# Patient Record
Sex: FEMALE | Race: WHITE | NOT HISPANIC OR LATINO | Employment: OTHER | URBAN - METROPOLITAN AREA
[De-identification: names, ages, dates, MRNs, and addresses within clinical notes are randomized per-mention and may not be internally consistent; named-entity substitution may affect disease eponyms.]

---

## 2023-02-01 PROBLEM — R10.813 ABDOMINAL RIGHT LOWER QUADRANT TENDERNESS: Status: ACTIVE | Noted: 2023-02-01

## 2023-02-01 PROBLEM — L30.9 DERMATITIS: Status: ACTIVE | Noted: 2023-02-01

## 2023-02-01 PROBLEM — M25.552 CHRONIC LEFT HIP PAIN: Status: ACTIVE | Noted: 2023-02-01

## 2023-02-01 PROBLEM — J30.9 ALLERGIC RHINITIS: Status: ACTIVE | Noted: 2023-02-01

## 2023-02-01 PROBLEM — M54.50 LUMBAR PAIN: Status: ACTIVE | Noted: 2023-02-01

## 2023-02-01 PROBLEM — M81.0 OSTEOPOROSIS: Status: ACTIVE | Noted: 2023-02-01

## 2023-02-01 PROBLEM — M54.2 NECK PAIN: Status: ACTIVE | Noted: 2023-02-01

## 2023-02-01 PROBLEM — M47.812 CERVICAL FACET JOINT SYNDROME: Status: ACTIVE | Noted: 2023-02-01

## 2023-02-01 PROBLEM — M25.512 SHOULDER PAIN, BILATERAL: Status: ACTIVE | Noted: 2023-02-01

## 2023-02-01 PROBLEM — R10.32 ABDOMINAL PAIN, LLQ (LEFT LOWER QUADRANT): Status: ACTIVE | Noted: 2023-02-01

## 2023-02-01 PROBLEM — M25.511 CHRONIC RIGHT SHOULDER PAIN: Status: ACTIVE | Noted: 2023-02-01

## 2023-02-01 PROBLEM — J06.9 ACUTE UPPER RESPIRATORY INFECTION: Status: ACTIVE | Noted: 2023-02-01

## 2023-02-01 PROBLEM — M75.102 ROTATOR CUFF TEAR ARTHROPATHY, LEFT: Status: ACTIVE | Noted: 2023-02-01

## 2023-02-01 PROBLEM — M54.6 THORACIC BACK PAIN: Status: ACTIVE | Noted: 2023-02-01

## 2023-02-01 PROBLEM — J20.9 ACUTE BRONCHITIS: Status: ACTIVE | Noted: 2023-02-01

## 2023-02-01 PROBLEM — B02.29 POSTHERPETIC NEURALGIA: Status: ACTIVE | Noted: 2023-02-01

## 2023-02-01 PROBLEM — L30.9 ECZEMA: Status: ACTIVE | Noted: 2023-02-01

## 2023-02-01 PROBLEM — J31.0 CHRONIC RHINITIS: Status: ACTIVE | Noted: 2023-02-01

## 2023-02-01 PROBLEM — R05.3 CHRONIC COUGH: Status: ACTIVE | Noted: 2023-02-01

## 2023-02-01 PROBLEM — B37.2 CANDIDAL INTERTRIGO: Status: ACTIVE | Noted: 2023-02-01

## 2023-02-01 PROBLEM — M47.819 ARTHRITIS OF FACET JOINTS AT MULTIPLE VERTEBRAL LEVELS: Status: ACTIVE | Noted: 2023-02-01

## 2023-02-01 PROBLEM — B02.9 SHINGLES OUTBREAK: Status: ACTIVE | Noted: 2023-02-01

## 2023-02-01 PROBLEM — N39.0 URINARY TRACT INFECTION, ACUTE: Status: ACTIVE | Noted: 2023-02-01

## 2023-02-01 PROBLEM — J32.9 SINOBRONCHITIS: Status: ACTIVE | Noted: 2023-02-01

## 2023-02-01 PROBLEM — J01.00 ACUTE MAXILLARY SINUSITIS: Status: ACTIVE | Noted: 2023-02-01

## 2023-02-01 PROBLEM — R82.81 PYURIA: Status: ACTIVE | Noted: 2023-02-01

## 2023-02-01 PROBLEM — M67.929 BICEPS TENDINOPATHY: Status: ACTIVE | Noted: 2023-02-01

## 2023-02-01 PROBLEM — M25.511 SHOULDER PAIN, BILATERAL: Status: ACTIVE | Noted: 2023-02-01

## 2023-02-01 PROBLEM — G89.29 CHRONIC RIGHT SHOULDER PAIN: Status: ACTIVE | Noted: 2023-02-01

## 2023-02-01 PROBLEM — M25.551 HIP PAIN, BILATERAL: Status: ACTIVE | Noted: 2023-02-01

## 2023-02-01 PROBLEM — G47.00 INSOMNIA: Status: ACTIVE | Noted: 2023-02-01

## 2023-02-01 PROBLEM — J40 SINOBRONCHITIS: Status: ACTIVE | Noted: 2023-02-01

## 2023-02-01 PROBLEM — Z91.038 ALLERGY TO INSECT BITES: Status: ACTIVE | Noted: 2023-02-01

## 2023-02-01 PROBLEM — M12.812 ROTATOR CUFF TEAR ARTHROPATHY, LEFT: Status: ACTIVE | Noted: 2023-02-01

## 2023-02-01 PROBLEM — E78.5 HYPERLIPIDEMIA: Status: ACTIVE | Noted: 2023-02-01

## 2023-02-01 PROBLEM — R60.9 EDEMA: Status: ACTIVE | Noted: 2023-02-01

## 2023-02-01 PROBLEM — I10 BENIGN ESSENTIAL HYPERTENSION: Status: ACTIVE | Noted: 2023-02-01

## 2023-02-01 PROBLEM — K21.9 ESOPHAGEAL REFLUX: Status: ACTIVE | Noted: 2023-02-01

## 2023-02-01 PROBLEM — M79.603 ARM PAIN: Status: ACTIVE | Noted: 2023-02-01

## 2023-02-01 PROBLEM — M62.838 MUSCLE SPASM: Status: ACTIVE | Noted: 2023-02-01

## 2023-02-01 PROBLEM — R30.0 DYSURIA: Status: ACTIVE | Noted: 2023-02-01

## 2023-02-01 PROBLEM — M50.90 CERVICAL DISC DISEASE: Status: ACTIVE | Noted: 2023-02-01

## 2023-02-01 PROBLEM — M54.12 CERVICAL RADICULOPATHY: Status: ACTIVE | Noted: 2023-02-01

## 2023-02-01 PROBLEM — I25.10 CORONARY ARTERY DISEASE: Status: ACTIVE | Noted: 2023-02-01

## 2023-02-01 PROBLEM — R53.83 FATIGUE: Status: ACTIVE | Noted: 2023-02-01

## 2023-02-01 PROBLEM — R10.13 EPIGASTRIC PAIN: Status: ACTIVE | Noted: 2023-02-01

## 2023-02-01 PROBLEM — G89.29 CHRONIC LEFT HIP PAIN: Status: ACTIVE | Noted: 2023-02-01

## 2023-02-01 RX ORDER — ATORVASTATIN CALCIUM 40 MG/1
20 TABLET, FILM COATED ORAL DAILY
COMMUNITY
End: 2023-05-25

## 2023-02-01 RX ORDER — PANTOPRAZOLE SODIUM 40 MG/1
1 TABLET, DELAYED RELEASE ORAL DAILY
COMMUNITY
Start: 2022-02-21 | End: 2023-05-01 | Stop reason: SDUPTHER

## 2023-02-01 RX ORDER — AMLODIPINE BESYLATE 5 MG/1
1 TABLET ORAL DAILY
COMMUNITY

## 2023-02-01 RX ORDER — NYSTATIN 100000 U/G
OINTMENT TOPICAL
COMMUNITY
Start: 2022-05-04

## 2023-02-01 RX ORDER — ASPIRIN 81 MG/1
1 TABLET ORAL 2 TIMES DAILY
COMMUNITY

## 2023-02-01 RX ORDER — VIT C/E/CUPERIC/ZINC/LUTEIN 226-90-0.8
CAPSULE ORAL
COMMUNITY

## 2023-02-01 RX ORDER — IBUPROFEN 800 MG/1
800 TABLET ORAL 4 TIMES DAILY
COMMUNITY
Start: 2016-02-18

## 2023-03-15 ENCOUNTER — OFFICE VISIT (OUTPATIENT)
Dept: PRIMARY CARE | Facility: CLINIC | Age: 83
End: 2023-03-15
Payer: MEDICARE

## 2023-03-15 VITALS
DIASTOLIC BLOOD PRESSURE: 92 MMHG | HEART RATE: 80 BPM | WEIGHT: 152 LBS | HEIGHT: 62 IN | SYSTOLIC BLOOD PRESSURE: 158 MMHG | BODY MASS INDEX: 27.97 KG/M2

## 2023-03-15 DIAGNOSIS — L29.9 ITCHING: ICD-10-CM

## 2023-03-15 DIAGNOSIS — I10 BENIGN ESSENTIAL HYPERTENSION: Primary | ICD-10-CM

## 2023-03-15 DIAGNOSIS — E78.2 MIXED HYPERLIPIDEMIA: ICD-10-CM

## 2023-03-15 PROCEDURE — 3077F SYST BP >= 140 MM HG: CPT | Performed by: INTERNAL MEDICINE

## 2023-03-15 PROCEDURE — 99213 OFFICE O/P EST LOW 20 MIN: CPT | Performed by: INTERNAL MEDICINE

## 2023-03-15 PROCEDURE — 3080F DIAST BP >= 90 MM HG: CPT | Performed by: INTERNAL MEDICINE

## 2023-03-15 PROCEDURE — 1036F TOBACCO NON-USER: CPT | Performed by: INTERNAL MEDICINE

## 2023-03-15 PROCEDURE — 1159F MED LIST DOCD IN RCRD: CPT | Performed by: INTERNAL MEDICINE

## 2023-03-15 RX ORDER — TRIAMCINOLONE ACETONIDE 1 MG/G
OINTMENT TOPICAL 2 TIMES DAILY PRN
Qty: 60 G | Refills: 0 | Status: SHIPPED | OUTPATIENT
Start: 2023-03-15 | End: 2023-07-13

## 2023-03-15 ASSESSMENT — ENCOUNTER SYMPTOMS
DEPRESSION: 0
SHORTNESS OF BREATH: 0
LOSS OF SENSATION IN FEET: 0
OCCASIONAL FEELINGS OF UNSTEADINESS: 0
CHEST TIGHTNESS: 0
HEADACHES: 0

## 2023-03-15 NOTE — PROGRESS NOTES
"Subjective   Carly Hoffman is a 82 y.o. female who presents for Hypertension.    She is here for routine follow up  Takes half ibuprofen twice a day  No falls.   She will see Dr Figueroa in August.    Review of Systems   Respiratory:  Negative for chest tightness and shortness of breath.    Cardiovascular:  Negative for chest pain.   Neurological:  Negative for headaches.           Objective   BP (!) 158/92   Pulse 80   Ht 1.575 m (5' 2\")   Wt 68.9 kg (152 lb)   BMI 27.80 kg/m²    Physical Exam  Visit Vitals  BP (!) 158/92   Pulse 80   Ht 1.575 m (5' 2\")   Wt 68.9 kg (152 lb)   BMI 27.80 kg/m²   Smoking Status Former   BSA 1.74 m²      GEN: NAD  HEENT: normal  NECK: no adenopathy, no thyroid enlargment  LUNGS: CTAB  CV: reg S1/S2 no murmurs  EXT: no leg edema   Assessment/Plan   Problem List Items Addressed This Visit          Circulatory    Benign essential hypertension - Primary    Relevant Orders    Comprehensive metabolic panel    Lipid Panel    TSH with reflex to Free T4 if abnormal    CBC and Auto Differential       Other    Medicare annual wellness visit, subsequent     Other Visit Diagnoses       Itching        Relevant Medications    triamcinolone (Kenalog) 0.1 % ointment               "

## 2023-03-21 PROBLEM — Z00.00 MEDICARE ANNUAL WELLNESS VISIT, SUBSEQUENT: Status: ACTIVE | Noted: 2023-03-21

## 2023-05-01 DIAGNOSIS — K21.9 GASTROESOPHAGEAL REFLUX DISEASE WITHOUT ESOPHAGITIS: Primary | ICD-10-CM

## 2023-05-02 RX ORDER — PANTOPRAZOLE SODIUM 40 MG/1
40 TABLET, DELAYED RELEASE ORAL
Qty: 90 TABLET | Refills: 1 | Status: SHIPPED | OUTPATIENT
Start: 2023-05-02 | End: 2023-10-30 | Stop reason: SDUPTHER

## 2023-05-20 DIAGNOSIS — I25.10 ATHEROSCLEROTIC HEART DISEASE OF NATIVE CORONARY ARTERY WITHOUT ANGINA PECTORIS: ICD-10-CM

## 2023-05-25 RX ORDER — ATORVASTATIN CALCIUM 40 MG/1
TABLET, FILM COATED ORAL
Qty: 45 TABLET | Refills: 3 | Status: SHIPPED | OUTPATIENT
Start: 2023-05-25 | End: 2024-05-16

## 2023-08-08 ENCOUNTER — TELEPHONE (OUTPATIENT)
Dept: PRIMARY CARE | Facility: CLINIC | Age: 83
End: 2023-08-08
Payer: MEDICARE

## 2023-08-08 NOTE — TELEPHONE ENCOUNTER
Pt daughter Jerrica called and has now became pt's health care power of . She called to see when pt's last colonoscopy and mammogram were.   [id number] to be done every 5 years and is due next year 2024.  Mammogram- last done in 2015. Daughter would like an order put in for a mammogram.  Jerrica also mentioned the family's concerns about pt's speech. Pt has some trouble saying a few words and they have noticed a difference in her speech and would like to know if this can be looked at or this can be treated. Daughter stated they do not believe she had a stroke, has dementia or any memory loss, just trouble with words.   Pt daughter Jerrica cell # [phone]  Work # [phone]

## 2023-08-09 DIAGNOSIS — E53.8 VITAMIN B12 DEFICIENCY: ICD-10-CM

## 2023-08-09 DIAGNOSIS — R41.3 MEMORY LOSS: Primary | ICD-10-CM

## 2023-08-09 DIAGNOSIS — E78.2 MIXED HYPERLIPIDEMIA: ICD-10-CM

## 2023-08-09 DIAGNOSIS — E55.9 VITAMIN D DEFICIENCY: ICD-10-CM

## 2023-08-09 DIAGNOSIS — Z12.31 VISIT FOR SCREENING MAMMOGRAM: Primary | ICD-10-CM

## 2023-08-09 DIAGNOSIS — I10 BENIGN ESSENTIAL HYPERTENSION: ICD-10-CM

## 2023-08-11 ENCOUNTER — LAB (OUTPATIENT)
Dept: LAB | Facility: LAB | Age: 83
End: 2023-08-11
Payer: MEDICARE

## 2023-08-11 DIAGNOSIS — I10 BENIGN ESSENTIAL HYPERTENSION: ICD-10-CM

## 2023-08-11 DIAGNOSIS — E55.9 VITAMIN D DEFICIENCY: ICD-10-CM

## 2023-08-11 DIAGNOSIS — R41.3 MEMORY LOSS: ICD-10-CM

## 2023-08-11 DIAGNOSIS — E78.2 MIXED HYPERLIPIDEMIA: ICD-10-CM

## 2023-08-11 DIAGNOSIS — E53.8 VITAMIN B12 DEFICIENCY: ICD-10-CM

## 2023-08-11 LAB
ALANINE AMINOTRANSFERASE (SGPT) (U/L) IN SER/PLAS: 18 U/L (ref 7–45)
ALBUMIN (G/DL) IN SER/PLAS: 4.4 G/DL (ref 3.4–5)
ALKALINE PHOSPHATASE (U/L) IN SER/PLAS: 61 U/L (ref 33–136)
ANION GAP IN SER/PLAS: 11 MMOL/L (ref 10–20)
ASPARTATE AMINOTRANSFERASE (SGOT) (U/L) IN SER/PLAS: 19 U/L (ref 9–39)
BASOPHILS (10*3/UL) IN BLOOD BY AUTOMATED COUNT: 0.06 X10E9/L (ref 0–0.1)
BASOPHILS/100 LEUKOCYTES IN BLOOD BY AUTOMATED COUNT: 0.8 % (ref 0–2)
BILIRUBIN TOTAL (MG/DL) IN SER/PLAS: 0.5 MG/DL (ref 0–1.2)
CALCIDIOL (25 OH VITAMIN D3) (NG/ML) IN SER/PLAS: 22 NG/ML
CALCIUM (MG/DL) IN SER/PLAS: 9.4 MG/DL (ref 8.6–10.6)
CARBON DIOXIDE, TOTAL (MMOL/L) IN SER/PLAS: 27 MMOL/L (ref 21–32)
CHLORIDE (MMOL/L) IN SER/PLAS: 102 MMOL/L (ref 98–107)
CHOLESTEROL (MG/DL) IN SER/PLAS: 151 MG/DL (ref 0–199)
CHOLESTEROL IN HDL (MG/DL) IN SER/PLAS: 69.8 MG/DL
CHOLESTEROL/HDL RATIO: 2.2
COBALAMIN (VITAMIN B12) (PG/ML) IN SER/PLAS: 300 PG/ML (ref 211–911)
CREATININE (MG/DL) IN SER/PLAS: 0.75 MG/DL (ref 0.5–1.05)
EOSINOPHILS (10*3/UL) IN BLOOD BY AUTOMATED COUNT: 0.26 X10E9/L (ref 0–0.4)
EOSINOPHILS/100 LEUKOCYTES IN BLOOD BY AUTOMATED COUNT: 3.4 % (ref 0–6)
ERYTHROCYTE DISTRIBUTION WIDTH (RATIO) BY AUTOMATED COUNT: 12.2 % (ref 11.5–14.5)
ERYTHROCYTE MEAN CORPUSCULAR HEMOGLOBIN CONCENTRATION (G/DL) BY AUTOMATED: 32 G/DL (ref 32–36)
ERYTHROCYTE MEAN CORPUSCULAR VOLUME (FL) BY AUTOMATED COUNT: 92 FL (ref 80–100)
ERYTHROCYTES (10*6/UL) IN BLOOD BY AUTOMATED COUNT: 4.71 X10E12/L (ref 4–5.2)
GFR FEMALE: 80 ML/MIN/1.73M2
GLUCOSE (MG/DL) IN SER/PLAS: 96 MG/DL (ref 74–99)
HEMATOCRIT (%) IN BLOOD BY AUTOMATED COUNT: 43.5 % (ref 36–46)
HEMOGLOBIN (G/DL) IN BLOOD: 13.9 G/DL (ref 12–16)
IMMATURE GRANULOCYTES/100 LEUKOCYTES IN BLOOD BY AUTOMATED COUNT: 0.4 % (ref 0–0.9)
LDL: 69 MG/DL (ref 0–99)
LEUKOCYTES (10*3/UL) IN BLOOD BY AUTOMATED COUNT: 7.7 X10E9/L (ref 4.4–11.3)
LYMPHOCYTES (10*3/UL) IN BLOOD BY AUTOMATED COUNT: 1.8 X10E9/L (ref 0.8–3)
LYMPHOCYTES/100 LEUKOCYTES IN BLOOD BY AUTOMATED COUNT: 23.3 % (ref 13–44)
MONOCYTES (10*3/UL) IN BLOOD BY AUTOMATED COUNT: 0.75 X10E9/L (ref 0.05–0.8)
MONOCYTES/100 LEUKOCYTES IN BLOOD BY AUTOMATED COUNT: 9.7 % (ref 2–10)
NEUTROPHILS (10*3/UL) IN BLOOD BY AUTOMATED COUNT: 4.82 X10E9/L (ref 1.6–5.5)
NEUTROPHILS/100 LEUKOCYTES IN BLOOD BY AUTOMATED COUNT: 62.4 % (ref 40–80)
NRBC (PER 100 WBCS) BY AUTOMATED COUNT: 0 /100 WBC (ref 0–0)
PLATELETS (10*3/UL) IN BLOOD AUTOMATED COUNT: 199 X10E9/L (ref 150–450)
POTASSIUM (MMOL/L) IN SER/PLAS: 4.4 MMOL/L (ref 3.5–5.3)
PROTEIN TOTAL: 6.7 G/DL (ref 6.4–8.2)
SODIUM (MMOL/L) IN SER/PLAS: 136 MMOL/L (ref 136–145)
THYROTROPIN (MIU/L) IN SER/PLAS BY DETECTION LIMIT <= 0.05 MIU/L: 2.39 MIU/L (ref 0.44–3.98)
TRIGLYCERIDE (MG/DL) IN SER/PLAS: 62 MG/DL (ref 0–149)
UREA NITROGEN (MG/DL) IN SER/PLAS: 17 MG/DL (ref 6–23)
VLDL: 12 MG/DL (ref 0–40)

## 2023-08-11 PROCEDURE — 85025 COMPLETE CBC W/AUTO DIFF WBC: CPT

## 2023-08-11 PROCEDURE — 82306 VITAMIN D 25 HYDROXY: CPT

## 2023-08-11 PROCEDURE — 84425 ASSAY OF VITAMIN B-1: CPT

## 2023-08-11 PROCEDURE — 82607 VITAMIN B-12: CPT

## 2023-08-11 PROCEDURE — 84443 ASSAY THYROID STIM HORMONE: CPT

## 2023-08-11 PROCEDURE — 80061 LIPID PANEL: CPT

## 2023-08-11 PROCEDURE — 36415 COLL VENOUS BLD VENIPUNCTURE: CPT

## 2023-08-11 PROCEDURE — 80053 COMPREHEN METABOLIC PANEL: CPT

## 2023-08-15 LAB — VITAMIN B1, WHOLE BLOOD: 134 NMOL/L (ref 70–180)

## 2023-09-20 ENCOUNTER — APPOINTMENT (OUTPATIENT)
Dept: PRIMARY CARE | Facility: CLINIC | Age: 83
End: 2023-09-20
Payer: MEDICARE

## 2023-09-26 ENCOUNTER — OFFICE VISIT (OUTPATIENT)
Dept: PRIMARY CARE | Facility: CLINIC | Age: 83
End: 2023-09-26
Payer: MEDICARE

## 2023-09-26 VITALS
RESPIRATION RATE: 16 BRPM | SYSTOLIC BLOOD PRESSURE: 139 MMHG | HEIGHT: 62 IN | WEIGHT: 148.4 LBS | BODY MASS INDEX: 27.31 KG/M2 | DIASTOLIC BLOOD PRESSURE: 80 MMHG | HEART RATE: 69 BPM

## 2023-09-26 DIAGNOSIS — I10 BENIGN ESSENTIAL HYPERTENSION: ICD-10-CM

## 2023-09-26 DIAGNOSIS — E78.49 OTHER HYPERLIPIDEMIA: ICD-10-CM

## 2023-09-26 DIAGNOSIS — R47.01 APHASIA: Primary | ICD-10-CM

## 2023-09-26 DIAGNOSIS — R47.1 DYSARTHRIA: ICD-10-CM

## 2023-09-26 PROCEDURE — 1126F AMNT PAIN NOTED NONE PRSNT: CPT | Performed by: INTERNAL MEDICINE

## 2023-09-26 PROCEDURE — 3075F SYST BP GE 130 - 139MM HG: CPT | Performed by: INTERNAL MEDICINE

## 2023-09-26 PROCEDURE — 1159F MED LIST DOCD IN RCRD: CPT | Performed by: INTERNAL MEDICINE

## 2023-09-26 PROCEDURE — 99214 OFFICE O/P EST MOD 30 MIN: CPT | Performed by: INTERNAL MEDICINE

## 2023-09-26 PROCEDURE — 3079F DIAST BP 80-89 MM HG: CPT | Performed by: INTERNAL MEDICINE

## 2023-09-26 PROCEDURE — 1036F TOBACCO NON-USER: CPT | Performed by: INTERNAL MEDICINE

## 2023-09-26 ASSESSMENT — ENCOUNTER SYMPTOMS
DIZZINESS: 0
HEADACHES: 0
SPEECH DIFFICULTY: 1

## 2023-09-26 ASSESSMENT — PATIENT HEALTH QUESTIONNAIRE - PHQ9
1. LITTLE INTEREST OR PLEASURE IN DOING THINGS: NOT AT ALL
2. FEELING DOWN, DEPRESSED OR HOPELESS: NOT AT ALL
SUM OF ALL RESPONSES TO PHQ9 QUESTIONS 1 AND 2: 0

## 2023-09-26 ASSESSMENT — PAIN SCALES - GENERAL: PAINLEVEL: 0-NO PAIN

## 2023-09-26 NOTE — PROGRESS NOTES
"Subjective   Carly Hoffman is a 82 y.o. female who presents for Follow-up.    She has been feeling well.    She has noticed that ine evenings,\"her talk is off\"  She says she's not confused, she just can't get the words out  She says , \" I just can't say some of the things I need to say\"  The longer the day is the worse it gets  Her  says this has been doing on a few years  The patient thought it began with the COVID vaccine    She does not have any trouble with chewing or swallowing      She has no concerns over her memory  No double vision  Does have a daytime only  license   She had a cataract surgery once, but she had some complications and needed a laser procedure so she does not want to have antoher cataract surgery done.       Review of Systems   Neurological:  Positive for speech difficulty. Negative for dizziness and headaches.   Memory test ( mini cog)   Banana, sunrise, chair :   Clock: all numbers were present but hands were incorrect, pointing at the 10 and the 11, but she mentioned she never was good at that  She will occasionally misplace words.   She does not feel muscle weakness in her face or neck.   She is not having double vision.   She is not having any motor symptoms.     Objective   /80 (BP Location: Left arm, Patient Position: Sitting, BP Cuff Size: Adult)   Pulse 69   Resp 16   Ht 1.575 m (5' 2\")   Wt 67.3 kg (148 lb 6.4 oz)   BMI 27.14 kg/m²    Physical Exam  HENT:      Head: Normocephalic.   Eyes:      Extraocular Movements: Extraocular movements intact.      Conjunctiva/sclera: Conjunctivae normal.      Pupils: Pupils are equal, round, and reactive to light.   Cardiovascular:      Rate and Rhythm: Normal rate and regular rhythm.      Heart sounds: Normal heart sounds.   Pulmonary:      Effort: Pulmonary effort is normal.      Breath sounds: Normal breath sounds.   Abdominal:      General: Abdomen is flat.      Palpations: Abdomen is soft.      Tenderness: There is " no abdominal tenderness.   Neurological:      Mental Status: She is alert and oriented to person, place, and time.      Cranial Nerves: Cranial nerves 2-12 are intact. No dysarthria or facial asymmetry.      Motor: No pronator drift.   Psychiatric:         Mood and Affect: Mood normal.         Thought Content: Thought content normal.         Assessment/Plan   Problem List Items Addressed This Visit       Benign essential hypertension continue on amlodipine     Hyperlipidemia continue on atorvastatin     Other Visit Diagnoses       Aphasia    -  Primary    Relevant Orders    MR brain wo IV contrast    Referral to Neurology    Dysarthria        Relevant Orders    MR brain wo IV contrast    Referral to Neurology

## 2023-09-26 NOTE — PATIENT INSTRUCTIONS
Schedule MRI of brain.  This can be done here at our building.    Schedule with Dr Garcia.    Monitor your symptoms.   See me again in 3 months.

## 2023-10-11 ENCOUNTER — HOSPITAL ENCOUNTER (OUTPATIENT)
Dept: RADIOLOGY | Facility: CLINIC | Age: 83
Discharge: HOME | End: 2023-10-11
Payer: MEDICARE

## 2023-10-11 DIAGNOSIS — R47.01 APHASIA: ICD-10-CM

## 2023-10-11 DIAGNOSIS — R47.1 DYSARTHRIA: ICD-10-CM

## 2023-10-11 PROCEDURE — 70551 MRI BRAIN STEM W/O DYE: CPT | Mod: ME

## 2023-10-11 PROCEDURE — 70551 MRI BRAIN STEM W/O DYE: CPT | Performed by: RADIOLOGY

## 2023-10-12 ENCOUNTER — TELEPHONE (OUTPATIENT)
Dept: PRIMARY CARE | Facility: CLINIC | Age: 83
End: 2023-10-12
Payer: MEDICARE

## 2023-10-12 NOTE — TELEPHONE ENCOUNTER
Pt daughter Jerrica called and requested she be called when results for pt's MRI come in. Daughter stated if there is bad news she would like to tell her mom herself that way she doesn't get upset. Daughter Jerrica is on HIPAA.  Jerrica- [phone]

## 2023-10-30 DIAGNOSIS — K21.9 GASTROESOPHAGEAL REFLUX DISEASE WITHOUT ESOPHAGITIS: ICD-10-CM

## 2023-10-30 RX ORDER — PANTOPRAZOLE SODIUM 40 MG/1
40 TABLET, DELAYED RELEASE ORAL
Qty: 90 TABLET | Refills: 1 | Status: SHIPPED | OUTPATIENT
Start: 2023-10-30

## 2023-12-18 ENCOUNTER — OFFICE VISIT (OUTPATIENT)
Dept: PRIMARY CARE | Facility: CLINIC | Age: 83
End: 2023-12-18
Payer: MEDICARE

## 2023-12-18 VITALS
WEIGHT: 152.6 LBS | HEIGHT: 62 IN | SYSTOLIC BLOOD PRESSURE: 140 MMHG | HEART RATE: 76 BPM | BODY MASS INDEX: 28.08 KG/M2 | DIASTOLIC BLOOD PRESSURE: 80 MMHG | RESPIRATION RATE: 16 BRPM

## 2023-12-18 DIAGNOSIS — I10 BENIGN ESSENTIAL HYPERTENSION: ICD-10-CM

## 2023-12-18 DIAGNOSIS — E55.9 VITAMIN D DEFICIENCY: ICD-10-CM

## 2023-12-18 DIAGNOSIS — Z13.89 ENCOUNTER FOR SCREENING FOR OTHER DISORDER: ICD-10-CM

## 2023-12-18 DIAGNOSIS — L29.9 ITCHING: Primary | ICD-10-CM

## 2023-12-18 DIAGNOSIS — L30.9 DERMATITIS: ICD-10-CM

## 2023-12-18 DIAGNOSIS — E53.8 VITAMIN B12 DEFICIENCY: ICD-10-CM

## 2023-12-18 DIAGNOSIS — Z00.00 ENCOUNTER FOR PREVENTATIVE ADULT HEALTH CARE EXAMINATION: ICD-10-CM

## 2023-12-18 PROCEDURE — 3079F DIAST BP 80-89 MM HG: CPT | Performed by: INTERNAL MEDICINE

## 2023-12-18 PROCEDURE — G0442 ANNUAL ALCOHOL SCREEN 15 MIN: HCPCS | Performed by: INTERNAL MEDICINE

## 2023-12-18 PROCEDURE — 1159F MED LIST DOCD IN RCRD: CPT | Performed by: INTERNAL MEDICINE

## 2023-12-18 PROCEDURE — 1126F AMNT PAIN NOTED NONE PRSNT: CPT | Performed by: INTERNAL MEDICINE

## 2023-12-18 PROCEDURE — 1170F FXNL STATUS ASSESSED: CPT | Performed by: INTERNAL MEDICINE

## 2023-12-18 PROCEDURE — 3077F SYST BP >= 140 MM HG: CPT | Performed by: INTERNAL MEDICINE

## 2023-12-18 PROCEDURE — 1036F TOBACCO NON-USER: CPT | Performed by: INTERNAL MEDICINE

## 2023-12-18 PROCEDURE — G0439 PPPS, SUBSEQ VISIT: HCPCS | Performed by: INTERNAL MEDICINE

## 2023-12-18 RX ORDER — TRIAMCINOLONE ACETONIDE 1 MG/G
OINTMENT TOPICAL
Qty: 30 G | Refills: 5 | Status: SHIPPED | OUTPATIENT
Start: 2023-12-18

## 2023-12-18 ASSESSMENT — LIFESTYLE VARIABLES
AUDIT-C TOTAL SCORE: 0
HOW MANY STANDARD DRINKS CONTAINING ALCOHOL DO YOU HAVE ON A TYPICAL DAY: PATIENT DOES NOT DRINK
HOW OFTEN DO YOU HAVE A DRINK CONTAINING ALCOHOL: NEVER
SKIP TO QUESTIONS 9-10: 1
HOW OFTEN DO YOU HAVE SIX OR MORE DRINKS ON ONE OCCASION: NEVER

## 2023-12-18 ASSESSMENT — ACTIVITIES OF DAILY LIVING (ADL)
TAKING_MEDICATION: INDEPENDENT
DOING_HOUSEWORK: INDEPENDENT
GROCERY_SHOPPING: INDEPENDENT
DRESSING: INDEPENDENT
BATHING: INDEPENDENT
MANAGING_FINANCES: INDEPENDENT

## 2023-12-18 ASSESSMENT — PAIN SCALES - GENERAL: PAINLEVEL: 0-NO PAIN

## 2023-12-18 ASSESSMENT — PATIENT HEALTH QUESTIONNAIRE - PHQ9
SUM OF ALL RESPONSES TO PHQ9 QUESTIONS 1 AND 2: 0
1. LITTLE INTEREST OR PLEASURE IN DOING THINGS: NOT AT ALL
2. FEELING DOWN, DEPRESSED OR HOPELESS: NOT AT ALL

## 2023-12-18 NOTE — PATIENT INSTRUCTIONS
See me in 6 months.    CLOSE TO THAT VISIT: Get blood test done after fasting overnight.  The  lab is on the first floor.  Lab hours are 7 am to 4 pm Mon-Fri and 8am to Noon on Saturday.  No appt is needed.  Orders are entered into the system so you do not need a paper order.

## 2023-12-18 NOTE — PROGRESS NOTES
"Subjective   Reason for Visit: Carly Hoffman is an 83 y.o. female here for a Medicare Wellness visit.          Reviewed all medications by prescribing practitioner or clinical pharmacist (such as prescriptions, OTCs, herbal therapies and supplements) and documented in the medical record.    HPI    She has been coughing but has been better since taking an otc allergy med 4 times a say  Her  ( brady \"kiana\") had them    She has a neurology appt for February   She still has those issues with trouble with fluency at night  She wondered if the covid booster could have caused this: no  She is taking tylenol pm: discussed this could cause this so she will lessen her usage    Patient Care Team:  Geraldine Hoang DO as PCP - General  Geraldine Hoang DO as PCP - [facility] Attributed Provider     Review of Systems    Objective   Vitals:  BP (!) 152/103 (BP Location: Right arm, Patient Position: Sitting, BP Cuff Size: Adult)   Pulse 76   Resp 16   Ht 1.575 m (5' 2\")   Wt 69.2 kg (152 lb 9.6 oz)   BMI 27.91 kg/m²       Physical Exam      GEN: NAD  HEENT: normal  NECK: no adenopathy, no thyroid enlargment  LUNGS: CTAB  CV: reg S1/S2 no murmurs  EXT: no leg edema   Assessment/Plan   Problem List Items Addressed This Visit    None  Enc for preventive health care visit / Medicare Wellness Visit completed today.  She needed refill of triamcinolone       "

## 2024-01-19 DIAGNOSIS — F41.9 ANXIETY: Primary | ICD-10-CM

## 2024-01-19 RX ORDER — LORAZEPAM 0.5 MG/1
TABLET ORAL
Qty: 15 TABLET | Refills: 0 | Status: SHIPPED | OUTPATIENT
Start: 2024-01-19
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
## 2024-01-24 ENCOUNTER — TELEPHONE (OUTPATIENT)
Dept: PRIMARY CARE | Facility: CLINIC | Age: 84
End: 2024-01-24
Payer: MEDICARE

## 2024-01-24 DIAGNOSIS — M47.819 ARTHRITIS OF FACET JOINTS AT MULTIPLE VERTEBRAL LEVELS: ICD-10-CM

## 2024-01-24 DIAGNOSIS — F09 COGNITIVE DYSFUNCTION: ICD-10-CM

## 2024-01-24 DIAGNOSIS — F43.11 ACUTE POST-TRAUMATIC STRESS DISORDER: Primary | ICD-10-CM

## 2024-01-24 DIAGNOSIS — F32.A DEPRESSION, UNSPECIFIED DEPRESSION TYPE: ICD-10-CM

## 2024-01-24 NOTE — TELEPHONE ENCOUNTER
Wilda, these orders are in. Do we need to fax them to this provider? The phone number for her is [phone] so they can get the fax and send it. . I sent it to Tarsha too.
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
## 2024-02-05 ENCOUNTER — TELEPHONE (OUTPATIENT)
Dept: PRIMARY CARE | Facility: CLINIC | Age: 84
End: 2024-02-05
Payer: MEDICARE

## 2024-02-05 NOTE — TELEPHONE ENCOUNTER
Daughter Chari called to inform you pt is going to counseling at [facility] in [facility].   [phone]

## 2024-02-13 ENCOUNTER — OFFICE VISIT (OUTPATIENT)
Dept: NEUROLOGY | Facility: CLINIC | Age: 84
End: 2024-02-13
Payer: MEDICARE

## 2024-02-13 VITALS
TEMPERATURE: 97.9 F | SYSTOLIC BLOOD PRESSURE: 144 MMHG | WEIGHT: 147 LBS | RESPIRATION RATE: 16 BRPM | BODY MASS INDEX: 27.05 KG/M2 | DIASTOLIC BLOOD PRESSURE: 84 MMHG | HEIGHT: 62 IN | HEART RATE: 80 BPM

## 2024-02-13 DIAGNOSIS — F51.02 ADJUSTMENT INSOMNIA: ICD-10-CM

## 2024-02-13 DIAGNOSIS — R47.9 DIFFICULTY WITH SPEECH: Primary | ICD-10-CM

## 2024-02-13 DIAGNOSIS — R41.3 MEMORY LOSS: ICD-10-CM

## 2024-02-13 DIAGNOSIS — G45.9 TIA (TRANSIENT ISCHEMIC ATTACK): ICD-10-CM

## 2024-02-13 DIAGNOSIS — R47.1 DYSARTHRIA: ICD-10-CM

## 2024-02-13 PROCEDURE — 1126F AMNT PAIN NOTED NONE PRSNT: CPT | Performed by: PSYCHIATRY & NEUROLOGY

## 2024-02-13 PROCEDURE — 3079F DIAST BP 80-89 MM HG: CPT | Performed by: PSYCHIATRY & NEUROLOGY

## 2024-02-13 PROCEDURE — 1036F TOBACCO NON-USER: CPT | Performed by: PSYCHIATRY & NEUROLOGY

## 2024-02-13 PROCEDURE — 99205 OFFICE O/P NEW HI 60 MIN: CPT | Performed by: PSYCHIATRY & NEUROLOGY

## 2024-02-13 PROCEDURE — 1157F ADVNC CARE PLAN IN RCRD: CPT | Performed by: PSYCHIATRY & NEUROLOGY

## 2024-02-13 PROCEDURE — 1159F MED LIST DOCD IN RCRD: CPT | Performed by: PSYCHIATRY & NEUROLOGY

## 2024-02-13 PROCEDURE — 3077F SYST BP >= 140 MM HG: CPT | Performed by: PSYCHIATRY & NEUROLOGY

## 2024-02-13 PROCEDURE — 1160F RVW MEDS BY RX/DR IN RCRD: CPT | Performed by: PSYCHIATRY & NEUROLOGY

## 2024-02-13 RX ORDER — ACETAMINOPHEN, DIPHENHYDRAMINE HCL, PHENYLEPHRINE HCL 325; 25; 5 MG/1; MG/1; MG/1
10 TABLET ORAL NIGHTLY
COMMUNITY

## 2024-02-13 RX ORDER — TRAZODONE HYDROCHLORIDE 50 MG/1
50 TABLET ORAL DAILY PRN
COMMUNITY
Start: 2024-01-24 | End: 2024-07-22

## 2024-02-13 RX ORDER — MAGNESIUM 250 MG
250 TABLET ORAL NIGHTLY
COMMUNITY

## 2024-02-13 ASSESSMENT — ENCOUNTER SYMPTOMS: SPEECH DIFFICULTY: 1
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
## 2024-02-13 NOTE — PATIENT INSTRUCTIONS
The patient needs an MRA of the brain, carotid ultrasound, echocardiogram and lipid panel.  The patient does need an EEG and a workup for the reversible causes of dementia.  The patient needs neuropsychiatric testing as well as a speech therapy consult.  I would certainly continue all of her medicines and stroke risk factor modification.  I would certainly continue her melatonin, magnesium and trazodone for sleep.  The patient certainly needs to continue to follow with her counselor for grief counseling.  The patient does need to follow-up with her geriatrician.  I discussed all these issues in detail with the patient and her daughter and answered all their questions.  The patient will follow-up with me in 4 months.

## 2024-02-17 ENCOUNTER — LAB (OUTPATIENT)
Dept: LAB | Facility: LAB | Age: 84
End: 2024-02-17
Payer: MEDICARE

## 2024-02-17 DIAGNOSIS — E53.8 VITAMIN B12 DEFICIENCY: ICD-10-CM

## 2024-02-17 DIAGNOSIS — I10 BENIGN ESSENTIAL HYPERTENSION: ICD-10-CM

## 2024-02-17 DIAGNOSIS — E55.9 VITAMIN D DEFICIENCY: ICD-10-CM

## 2024-02-17 DIAGNOSIS — R41.3 MEMORY LOSS: ICD-10-CM

## 2024-02-17 PROCEDURE — 84443 ASSAY THYROID STIM HORMONE: CPT

## 2024-02-17 PROCEDURE — 83921 ORGANIC ACID SINGLE QUANT: CPT

## 2024-02-17 PROCEDURE — 80061 LIPID PANEL: CPT

## 2024-02-17 PROCEDURE — 82746 ASSAY OF FOLIC ACID SERUM: CPT

## 2024-02-17 PROCEDURE — 82306 VITAMIN D 25 HYDROXY: CPT

## 2024-02-17 PROCEDURE — 80053 COMPREHEN METABOLIC PANEL: CPT

## 2024-02-17 PROCEDURE — 85025 COMPLETE CBC W/AUTO DIFF WBC: CPT

## 2024-02-17 PROCEDURE — 36415 COLL VENOUS BLD VENIPUNCTURE: CPT

## 2024-02-17 PROCEDURE — 82607 VITAMIN B-12: CPT

## 2024-02-18 LAB
25(OH)D3 SERPL-MCNC: 17 NG/ML (ref 30–100)
ALBUMIN SERPL BCP-MCNC: 4.4 G/DL (ref 3.4–5)
ALP SERPL-CCNC: 55 U/L (ref 33–136)
ALT SERPL W P-5'-P-CCNC: 20 U/L (ref 7–45)
ANION GAP SERPL CALC-SCNC: 16 MMOL/L (ref 10–20)
AST SERPL W P-5'-P-CCNC: 21 U/L (ref 9–39)
BASOPHILS # BLD AUTO: 0.05 X10*3/UL (ref 0–0.1)
BASOPHILS NFR BLD AUTO: 0.8 %
BILIRUB SERPL-MCNC: 0.6 MG/DL (ref 0–1.2)
BUN SERPL-MCNC: 10 MG/DL (ref 6–23)
CALCIUM SERPL-MCNC: 9.7 MG/DL (ref 8.6–10.6)
CHLORIDE SERPL-SCNC: 98 MMOL/L (ref 98–107)
CHOLEST SERPL-MCNC: 148 MG/DL (ref 0–199)
CHOLESTEROL/HDL RATIO: 2
CO2 SERPL-SCNC: 26 MMOL/L (ref 21–32)
CREAT SERPL-MCNC: 0.8 MG/DL (ref 0.5–1.05)
EGFRCR SERPLBLD CKD-EPI 2021: 74 ML/MIN/1.73M*2
EOSINOPHIL # BLD AUTO: 0.17 X10*3/UL (ref 0–0.4)
EOSINOPHIL NFR BLD AUTO: 2.6 %
ERYTHROCYTE [DISTWIDTH] IN BLOOD BY AUTOMATED COUNT: 11.8 % (ref 11.5–14.5)
FOLATE SERPL-MCNC: >24 NG/ML
GLUCOSE SERPL-MCNC: 97 MG/DL (ref 74–99)
HCT VFR BLD AUTO: 43.7 % (ref 36–46)
HDLC SERPL-MCNC: 74.8 MG/DL
HGB BLD-MCNC: 14.6 G/DL (ref 12–16)
IMM GRANULOCYTES # BLD AUTO: 0.03 X10*3/UL (ref 0–0.5)
IMM GRANULOCYTES NFR BLD AUTO: 0.5 % (ref 0–0.9)
LDLC SERPL CALC-MCNC: 60 MG/DL
LYMPHOCYTES # BLD AUTO: 1.53 X10*3/UL (ref 0.8–3)
LYMPHOCYTES NFR BLD AUTO: 23.2 %
MCH RBC QN AUTO: 29.9 PG (ref 26–34)
MCHC RBC AUTO-ENTMCNC: 33.4 G/DL (ref 32–36)
MCV RBC AUTO: 90 FL (ref 80–100)
MONOCYTES # BLD AUTO: 0.82 X10*3/UL (ref 0.05–0.8)
MONOCYTES NFR BLD AUTO: 12.4 %
NEUTROPHILS # BLD AUTO: 4 X10*3/UL (ref 1.6–5.5)
NEUTROPHILS NFR BLD AUTO: 60.5 %
NON HDL CHOLESTEROL: 73 MG/DL (ref 0–149)
NRBC BLD-RTO: 0 /100 WBCS (ref 0–0)
PLATELET # BLD AUTO: 205 X10*3/UL (ref 150–450)
POTASSIUM SERPL-SCNC: 4.6 MMOL/L (ref 3.5–5.3)
PROT SERPL-MCNC: 6.5 G/DL (ref 6.4–8.2)
RBC # BLD AUTO: 4.88 X10*6/UL (ref 4–5.2)
SODIUM SERPL-SCNC: 135 MMOL/L (ref 136–145)
TRIGL SERPL-MCNC: 64 MG/DL (ref 0–149)
TSH SERPL-ACNC: 1.57 MIU/L (ref 0.44–3.98)
VIT B12 SERPL-MCNC: 450 PG/ML (ref 211–911)
VLDL: 13 MG/DL (ref 0–40)
WBC # BLD AUTO: 6.6 X10*3/UL (ref 4.4–11.3)

## 2024-02-19 ENCOUNTER — HOSPITAL ENCOUNTER (OUTPATIENT)
Dept: NEUROLOGY | Facility: HOSPITAL | Age: 84
Discharge: HOME | End: 2024-02-19
Payer: MEDICARE

## 2024-02-19 DIAGNOSIS — R41.3 MEMORY LOSS: ICD-10-CM

## 2024-02-19 DIAGNOSIS — E55.9 VITAMIN D DEFICIENCY: Primary | ICD-10-CM

## 2024-02-19 DIAGNOSIS — E87.1 HYPONATREMIA: ICD-10-CM

## 2024-02-19 DIAGNOSIS — R47.9 DIFFICULTY WITH SPEECH: ICD-10-CM

## 2024-02-19 PROCEDURE — 95816 EEG AWAKE AND DROWSY: CPT | Performed by: PSYCHIATRY & NEUROLOGY

## 2024-02-19 PROCEDURE — 95816 EEG AWAKE AND DROWSY: CPT

## 2024-02-19 RX ORDER — ACETAMINOPHEN 500 MG
5000 TABLET ORAL DAILY
Qty: 90 TABLET | Refills: 1 | Status: SHIPPED | OUTPATIENT
Start: 2024-02-19

## 2024-02-21 LAB — METHYLMALONATE SERPL-SCNC: 0.1 UMOL/L (ref 0–0.4)

## 2024-03-13 ENCOUNTER — HOSPITAL ENCOUNTER (OUTPATIENT)
Dept: CARDIOLOGY | Facility: CLINIC | Age: 84
Discharge: HOME | End: 2024-03-13
Payer: MEDICARE

## 2024-03-13 ENCOUNTER — HOSPITAL ENCOUNTER (OUTPATIENT)
Dept: VASCULAR MEDICINE | Facility: CLINIC | Age: 84
Discharge: HOME | End: 2024-03-13
Payer: MEDICARE

## 2024-03-13 DIAGNOSIS — R47.1 DYSARTHRIA: ICD-10-CM

## 2024-03-13 DIAGNOSIS — R47.81 SLURRED SPEECH: ICD-10-CM

## 2024-03-13 DIAGNOSIS — G45.9 TIA (TRANSIENT ISCHEMIC ATTACK): ICD-10-CM

## 2024-03-13 DIAGNOSIS — R09.89 OTHER SPECIFIED SYMPTOMS AND SIGNS INVOLVING THE CIRCULATORY AND RESPIRATORY SYSTEMS: ICD-10-CM

## 2024-03-13 PROCEDURE — 93306 TTE W/DOPPLER COMPLETE: CPT | Performed by: STUDENT IN AN ORGANIZED HEALTH CARE EDUCATION/TRAINING PROGRAM

## 2024-03-13 PROCEDURE — 93880 EXTRACRANIAL BILAT STUDY: CPT | Performed by: INTERNAL MEDICINE

## 2024-03-13 PROCEDURE — 93880 EXTRACRANIAL BILAT STUDY: CPT

## 2024-03-13 PROCEDURE — 93306 TTE W/DOPPLER COMPLETE: CPT

## 2024-03-14 DIAGNOSIS — I65.21 INTERNAL CAROTID ARTERY STENOSIS, RIGHT: ICD-10-CM

## 2024-03-14 LAB
AORTIC VALVE PEAK VELOCITY: 1.68 M/S
AV PEAK GRADIENT: 11.3 MMHG
AVA (PEAK VEL): 1.96 CM2
EJECTION FRACTION APICAL 4 CHAMBER: 68.3
EJECTION FRACTION: 70 %
LEFT ATRIUM VOLUME AREA LENGTH INDEX BSA: 29.2 ML/M2
LEFT VENTRICLE INTERNAL DIMENSION DIASTOLE: 3.3 CM (ref 3.5–6)
LEFT VENTRICULAR OUTFLOW TRACT DIAMETER: 2.04 CM
MITRAL VALVE E/A RATIO: 0.77
MITRAL VALVE E/E' RATIO: 9.97
RIGHT VENTRICLE FREE WALL PEAK S': 10.73 CM/S
TRICUSPID ANNULAR PLANE SYSTOLIC EXCURSION: 2.6 CM

## 2024-03-15 ENCOUNTER — HOSPITAL ENCOUNTER (OUTPATIENT)
Dept: RADIOLOGY | Facility: CLINIC | Age: 84
Discharge: HOME | End: 2024-03-15
Payer: MEDICARE

## 2024-03-15 DIAGNOSIS — I65.21 INTERNAL CAROTID ARTERY STENOSIS, RIGHT: ICD-10-CM

## 2024-03-15 DIAGNOSIS — G45.9 TIA (TRANSIENT ISCHEMIC ATTACK): ICD-10-CM

## 2024-03-15 PROCEDURE — 70544 MR ANGIOGRAPHY HEAD W/O DYE: CPT

## 2024-03-15 PROCEDURE — 70547 MR ANGIOGRAPHY NECK W/O DYE: CPT | Performed by: STUDENT IN AN ORGANIZED HEALTH CARE EDUCATION/TRAINING PROGRAM

## 2024-03-15 PROCEDURE — 70544 MR ANGIOGRAPHY HEAD W/O DYE: CPT | Performed by: STUDENT IN AN ORGANIZED HEALTH CARE EDUCATION/TRAINING PROGRAM

## 2024-03-15 PROCEDURE — 70547 MR ANGIOGRAPHY NECK W/O DYE: CPT

## 2024-03-20 DIAGNOSIS — I65.21 OCCLUSION OF RIGHT CAROTID ARTERY: ICD-10-CM

## 2024-04-05 ENCOUNTER — TELEPHONE (OUTPATIENT)
Dept: PRIMARY CARE | Facility: CLINIC | Age: 84
End: 2024-04-05
Payer: MEDICARE

## 2024-04-05 NOTE — TELEPHONE ENCOUNTER
Daughter Jerrica called stating the patient had stomach virus    The patient had diarrhea and cramping    Jerrica states she is better now    She said she gave her Pedialyte, crackers, and ginger ale    She also gave he Immodium and she want to know if that is ok     Please advise

## 2024-05-14 ENCOUNTER — OFFICE VISIT (OUTPATIENT)
Dept: VASCULAR SURGERY | Facility: CLINIC | Age: 84
End: 2024-05-14
Payer: MEDICARE

## 2024-05-14 VITALS
BODY MASS INDEX: 25.4 KG/M2 | DIASTOLIC BLOOD PRESSURE: 64 MMHG | WEIGHT: 138 LBS | HEIGHT: 62 IN | SYSTOLIC BLOOD PRESSURE: 126 MMHG | OXYGEN SATURATION: 99 % | HEART RATE: 89 BPM

## 2024-05-14 DIAGNOSIS — I65.21 OCCLUSION OF RIGHT CAROTID ARTERY: ICD-10-CM

## 2024-05-14 PROCEDURE — 1036F TOBACCO NON-USER: CPT | Performed by: SURGERY

## 2024-05-14 PROCEDURE — 1157F ADVNC CARE PLAN IN RCRD: CPT | Performed by: SURGERY

## 2024-05-14 PROCEDURE — 99204 OFFICE O/P NEW MOD 45 MIN: CPT | Performed by: SURGERY

## 2024-05-14 PROCEDURE — 1159F MED LIST DOCD IN RCRD: CPT | Performed by: SURGERY

## 2024-05-14 PROCEDURE — 1160F RVW MEDS BY RX/DR IN RCRD: CPT | Performed by: SURGERY

## 2024-05-14 PROCEDURE — 3074F SYST BP LT 130 MM HG: CPT | Performed by: SURGERY

## 2024-05-14 PROCEDURE — 3078F DIAST BP <80 MM HG: CPT | Performed by: SURGERY

## 2024-05-14 ASSESSMENT — ENCOUNTER SYMPTOMS
DIZZINESS: 0
WOUND: 0
HEADACHES: 0
SPEECH DIFFICULTY: 1
NUMBNESS: 0
GASTROINTESTINAL NEGATIVE: 1
PSYCHIATRIC NEGATIVE: 1
COUGH: 0
COLOR CHANGE: 0
SHORTNESS OF BREATH: 0
BACK PAIN: 0
EYES NEGATIVE: 1
ENDOCRINE NEGATIVE: 1
CONSTITUTIONAL NEGATIVE: 1
WEAKNESS: 0
BRUISES/BLEEDS EASILY: 0

## 2024-05-14 NOTE — PROGRESS NOTES
History Of Present Illness  Carly Hoffman is a 83 y.o. female presenting for carotid evaluation. She was referred by Dr. Garcia. She presents with her granddaughter who helps her give a history.  States that she speaks over time.  She states this has been going on since 2020.  She denies any specific episodes but states it has been a gradual process.  She denies any extremity weakness or visual changes.  In her workup she did have a carotid duplex and MRA, both of which revealed findings of right carotid artery occlusion.     Past Medical History  She has a past medical history of Body mass index (BMI) 28.0-28.9, adult, Myocardial infarction (Multi) (2006), Personal history of other diseases of the circulatory system (03/16/2016), and Personal history of other diseases of the respiratory system (01/14/2021).    Surgical History  She has a past surgical history that includes Other surgical history (03/16/2016); Total hip arthroplasty (Bilateral, 10/23/2019); Colonoscopy (06/18/2020); Carpal tunnel release (06/29/2015); Other surgical history (06/29/2015); Hysterectomy (06/29/2015); Other surgical history (06/29/2015); Cataract extraction (Right); and Breast biopsy.     Social History  She reports that she quit smoking about 54 years ago. Her smoking use included cigarettes. She has been exposed to tobacco smoke. She has never used smokeless tobacco. She reports that she does not currently use alcohol. She reports that she does not use drugs.    Family History  Family History   Problem Relation Name Age of Onset    Other (CABG) Mother      Coronary artery disease Mother      Macular degeneration Mother      Alcohol abuse Father      Liver cancer Father      Dementia Brother          Allergies  Codeine phosphate    Review of Systems   Constitutional: Negative.    HENT: Negative.     Eyes: Negative.    Respiratory:  Negative for cough and shortness of breath.    Cardiovascular:  Negative for chest pain and leg  "swelling.   Gastrointestinal: Negative.    Endocrine: Negative.    Genitourinary: Negative.    Musculoskeletal:  Negative for back pain and gait problem.   Skin:  Negative for color change, pallor and wound.   Neurological:  Positive for speech difficulty. Negative for dizziness, syncope, weakness, numbness and headaches.   Hematological:  Does not bruise/bleed easily.   Psychiatric/Behavioral: Negative.          Physical Exam  Vitals reviewed.   Constitutional:       General: She is not in acute distress.     Appearance: Normal appearance. She is normal weight.   HENT:      Head: Normocephalic and atraumatic.   Eyes:      Extraocular Movements: Extraocular movements intact.      Conjunctiva/sclera: Conjunctivae normal.   Neck:      Vascular: No carotid bruit.   Cardiovascular:      Rate and Rhythm: Normal rate and regular rhythm.      Pulses: Normal pulses.           Radial pulses are 2+ on the right side and 2+ on the left side.      Heart sounds: Normal heart sounds.   Pulmonary:      Effort: Pulmonary effort is normal.      Breath sounds: Normal breath sounds.   Abdominal:      General: Abdomen is flat.      Palpations: Abdomen is soft.   Musculoskeletal:         General: No swelling. Normal range of motion.      Cervical back: Normal range of motion. No tenderness.   Skin:     General: Skin is warm and dry.   Neurological:      General: No focal deficit present.      Mental Status: She is alert and oriented to person, place, and time.      Cranial Nerves: Dysarthria present. No cranial nerve deficit.      Sensory: No sensory deficit.      Motor: Motor function is intact. No weakness.   Psychiatric:         Mood and Affect: Mood normal.         Behavior: Behavior normal.          Last Recorded Vitals  Blood pressure 126/64, pulse 89, height 1.575 m (5' 2\"), weight 62.6 kg (138 lb), SpO2 99%.    Relevant Results      Current Outpatient Medications:     amLODIPine (Norvasc) 5 mg tablet, Take 1 tablet (5 mg) by " mouth once daily., Disp: , Rfl:     aspirin 81 mg EC tablet, Take 1 tablet (81 mg) by mouth 2 times a day., Disp: , Rfl:     atorvastatin (Lipitor) 40 mg tablet, TAKE 1/2 TABLET BY MOUTH EVERY DAY, Disp: 45 tablet, Rfl: 3    cholecalciferol (Vitamin D-3) 5,000 Units tablet, Take 1 tablet (5,000 Units) by mouth once daily., Disp: 90 tablet, Rfl: 1    cyanocobalamin, vitamin B-12, (VITAMIN B-12 ORAL), TABS, Disp: , Rfl:     ibuprofen 800 mg tablet, Take 1 tablet (800 mg) by mouth 4 times a day. With food for pain, Disp: , Rfl:     LORazepam (Ativan) 0.5 mg tablet, One tablet every 8 hours as needed for anxiety or sleeplessness. (Patient not taking: Reported on 2/13/2024), Disp: 15 tablet, Rfl: 0    magnesium 250 mg tablet, Take 1 tablet (250 mg) by mouth once daily at bedtime., Disp: , Rfl:     melatonin 10 mg tablet, Take 1 tablet (10 mg) by mouth once daily at bedtime., Disp: , Rfl:     nystatin (Mycostatin) ointment, Apply 2-3 times daily to affected area(s), Disp: , Rfl:     pantoprazole (ProtoNix) 40 mg EC tablet, Take 1 tablet (40 mg) by mouth once daily in the morning. Take before meals. (Patient not taking: Reported on 2/13/2024), Disp: 90 tablet, Rfl: 1    POTASSIUM ORAL, Take tablet PRN, Disp: , Rfl:     traZODone (Desyrel) 50 mg tablet, Take 1 tablet (50 mg) by mouth once daily as needed for sleep., Disp: , Rfl:     triamcinolone (Kenalog) 0.1 % ointment, Apply to affected areas twice a day as needed. (Patient not taking: Reported on 2/13/2024), Disp: 30 g, Rfl: 5    vit C-E-cupric-zinc-lutein (PreserVision Lutein) 226-90-0.8-5 mg capsule capsule, Take as directed, Disp: , Rfl:        MR ANGIO NECK WO IV CONTRAST; MR ANGIO HEAD WO IV CONTRAST;  3/15/2024 12:10 pm  INDICATION:  Signs/Symptoms: right ICA occlusion seen on carotid US; TIA.  COMPARISON:  Carotid ultrasound, 03/13/2024 and brain MRI, 10/11/2023  ACCESSION NUMBER(S):  [id number]; [id number]  ORDERING CLINICIAN:  EFREM  ANDREFSKY  TECHNIQUE:  Time-of-flight MRA of the head and neck was performed. The images  were reviewed as source images and maximum intensity projections.  FINDINGS:  MRA head:  Anterior circulation:  No flow signal is visualized in the petrous or  cavernous segments of the right internal carotid artery. Diminished  flow signal in the supraclinoid ICA is likely supplied via  collaterals. There is also asymmetrically decreased flow signal in  the right middle cerebral artery, no superimposed narrowing is  identified. There is expected flow signal in the intracranial left  internal carotid artery, the proximal left middle cerebral artery,  and both proximal anterior cerebral arteries. No aneurysm.  Posterior circulation:  Normal variant fetal type right posterior  cerebral artery. Bilateral intracranial vertebral arteries,  vertebrobasilar junction, basilar artery and proximal posterior  cerebral arteries demonstrate expected flow signal. No aneurysm.  MRA neck:  The source images are mildly degraded by artifact.  Right carotid vessels:  There is complete absence of flow signal in  the right common and internal carotid arteries  Left carotid vessels:   There is expected flow signal in the  visualized portion of the common carotid artery.  Mural low signal  intensity plaque in the bifurcation and proximal internal carotid  artery is consistent with partially calcified atherosclerotic plaque.  This results in 45-55% narrowing of the proximal ICA by NASCET  criteria. There is expected flow signal distally.   Vertebral vessels:   The visualized segments of the cervical  vertebral arteries demonstrate expected flow signal.  IMPRESSION:  1. Occlusion of the right common and internal carotid arteries with  diminished flow in the right anterior circulation.  2. Atherosclerotic disease with moderate narrowing of the left  cervical ICA.  3. No other flow-limiting stenosis or occlusion in the head or neck.  MACRO: None  Signed  by: George Quezada 3/15/2024 3:14 PM      [facility] US CAROTID ARTERY DUPLEX BILATERAL  Patient Name:      ROSY SALDIVAR XENA        Reading Physician:  [id number] Cheyanne Deras MD  Study Date:        3/13/2024            Ordering Physician: [id number] EFREM LEDBETTER  MRN/PID:           [id number]             Technologist:       Loi Pruitt RVT [facility]  Accession#:        [id number]         Technologist 2:  Date of Birth/Age: 1940 / 83 years Encounter#:         [id number]  Gender:            F  Admission Status:  Outpatient           Location Performed: [facility]  Diagnosis/ICD: Episode of change in speech-R47.81; Other specified symptoms and                 signs involving the circulatory and respiratory systems-R09.89  CPT Codes:     78915 Cerebrovascular Carotid Duplex scan complete  **Report Amended**  Report Amended By: [id number] Cheyanne Deras MD      Date and Time: 3/13/2024 at 5:06:13 PM  **CRITICAL RESULT**  Critical Result: RT ICA occlusion  Notification called to Dr. Ledbetter on 3/13/2024 at 1:50:00 PM by .  CONCLUSIONS:  Right Carotid: Findings are consistent with an occlusion of the right internal carotid artery. No flow seen by color Doppler. Eca is noted to be occluded bur reconstitutes via retrograde flow from branches. The right vertebral artery is patent with antegrade flow. No evidence of hemodynamically significant stenosis in the right subclavian artery.  Left Carotid: Findings are consistent with less than 50% stenosis of the left proximal internal carotid artery. There are elevated velocities in the left ECA that are suggestive of disease. The left vertebral artery is patent with antegrade flow. No evidence of hemodynamically significant  stenosis in the left subclavian artery.  Additional Findings:  With the limitations of the US, complicated plaque can not be excluded.  Imaging & Doppler Findings:  Right Plaque Morph: The proximal right internal carotid artery demonstrates heterogenous plaque. The proximal right external carotid artery demonstrates heterogenous plaque. The distal right common carotid artery demonstrates heterogenous and irregular plaque.  Left Plaque Morph: The proximal left internal carotid artery demonstrates heterogenous plaque. The left carotid bulb demonstrates heterogenous plaque.   Right                        Left    PSV      EDV                PSV      EDV  39 cm/s            CCA P    109 cm/s  34 cm/s            CCA D    76 cm/s                     ICA P    143 cm/s 54 cm/s                     ICA M    145 cm/s 43 cm/s                     ICA D    129 cm/s 43 cm/s  70 cm/s             ECA     257 cm/s  71 cm/s  21 cm/s Vertebral  57 cm/s  20 cm/s  171 cm/s         Subclavian 122 cm/s                Left  ICA/CCA Ratio 1.9     [id number] Cheyanne Deras MD  Electronically signed by [id number] Cheyanne Deras MD on 3/13/2024 at 5:06:13 PM         Assessment/Plan   Diagnoses and all orders for this visit:  Occlusion of right carotid artery  -     Referral to Vascular Surgery  -     Vascular US carotid artery duplex bilateral; Future      84yo female with progressive dysarthria since 2020.  Both carotid duplex and MRA revealed occluded right internal carotid artery.  Given the complete occlusion, there is no indication for surgery.  I would recommend medical management only.  She is currently on aspirin and atorvastatin.  She should continue these for management of atherosclerotic disease.  Will continue to monitor her left side.  She is to follow-up in 6 months with repeat carotid duplex.  Patient and granddaughter are also inquiring about cataract surgery that patient was recommended to have.  She is stable from a vascular standpoint  to proceed with this procedure.           Nancy Reese MD

## 2024-05-16 ENCOUNTER — OFFICE VISIT (OUTPATIENT)
Dept: URGENT CARE | Facility: CLINIC | Age: 84
End: 2024-05-16
Payer: MEDICARE

## 2024-05-16 VITALS
HEART RATE: 86 BPM | SYSTOLIC BLOOD PRESSURE: 117 MMHG | OXYGEN SATURATION: 94 % | TEMPERATURE: 98.2 F | DIASTOLIC BLOOD PRESSURE: 74 MMHG | WEIGHT: 138 LBS | RESPIRATION RATE: 16 BRPM | BODY MASS INDEX: 25.24 KG/M2

## 2024-05-16 DIAGNOSIS — I25.10 ATHEROSCLEROTIC HEART DISEASE OF NATIVE CORONARY ARTERY WITHOUT ANGINA PECTORIS: ICD-10-CM

## 2024-05-16 DIAGNOSIS — J20.9 ACUTE BRONCHITIS, UNSPECIFIED ORGANISM: Primary | ICD-10-CM

## 2024-05-16 PROCEDURE — 3074F SYST BP LT 130 MM HG: CPT | Performed by: PHYSICIAN ASSISTANT

## 2024-05-16 PROCEDURE — 1126F AMNT PAIN NOTED NONE PRSNT: CPT | Performed by: PHYSICIAN ASSISTANT

## 2024-05-16 PROCEDURE — 99203 OFFICE O/P NEW LOW 30 MIN: CPT | Performed by: PHYSICIAN ASSISTANT

## 2024-05-16 PROCEDURE — 1160F RVW MEDS BY RX/DR IN RCRD: CPT | Performed by: PHYSICIAN ASSISTANT

## 2024-05-16 PROCEDURE — 3078F DIAST BP <80 MM HG: CPT | Performed by: PHYSICIAN ASSISTANT

## 2024-05-16 PROCEDURE — 1036F TOBACCO NON-USER: CPT | Performed by: PHYSICIAN ASSISTANT

## 2024-05-16 PROCEDURE — 1157F ADVNC CARE PLAN IN RCRD: CPT | Performed by: PHYSICIAN ASSISTANT

## 2024-05-16 PROCEDURE — 1159F MED LIST DOCD IN RCRD: CPT | Performed by: PHYSICIAN ASSISTANT

## 2024-05-16 RX ORDER — ATORVASTATIN CALCIUM 40 MG/1
TABLET, FILM COATED ORAL
Qty: 45 TABLET | Refills: 3 | Status: SHIPPED | OUTPATIENT
Start: 2024-05-16

## 2024-05-16 RX ORDER — BENZONATATE 100 MG/1
100 CAPSULE ORAL 3 TIMES DAILY PRN
Qty: 30 CAPSULE | Refills: 0 | Status: SHIPPED | OUTPATIENT
Start: 2024-05-16 | End: 2024-05-26

## 2024-05-16 RX ORDER — AZITHROMYCIN 250 MG/1
TABLET, FILM COATED ORAL
Qty: 6 TABLET | Refills: 0 | Status: SHIPPED | OUTPATIENT
Start: 2024-05-16

## 2024-05-16 ASSESSMENT — PAIN SCALES - GENERAL: PAINLEVEL: 0-NO PAIN

## 2024-05-16 ASSESSMENT — ENCOUNTER SYMPTOMS: COUGH: 1

## 2024-05-16 NOTE — PROGRESS NOTES
Subjective   Patient ID: Carly Hoffman is a 83 y.o. female.    Patient is an 83-year-old female who complains of worsening loose, productive cough that she has been experiencing for the past 4 days after returning from a trip to [place].  Patient denies fever, chills or myalgia.  Patient reports no congestion, sinus pressure, ear pain or sore throat.  Patient has no history of asthma or COPD and does not smoke.      URI  Presenting symptoms: cough    The following portions of the chart were reviewed this encounter and updated as appropriate:       Review of Systems   Respiratory:  Positive for cough.    All other systems reviewed and are negative.  Objective   Physical Exam  Vitals and nursing note reviewed.   Constitutional:       Appearance: Normal appearance. She is normal weight.   HENT:      Head: Normocephalic and atraumatic.      Right Ear: Tympanic membrane, ear canal and external ear normal.      Left Ear: Tympanic membrane, ear canal and external ear normal.      Nose: Nose normal. No congestion or rhinorrhea.      Mouth/Throat:      Mouth: Mucous membranes are moist.      Pharynx: Oropharynx is clear. No oropharyngeal exudate or posterior oropharyngeal erythema.   Eyes:      Extraocular Movements: Extraocular movements intact.      Conjunctiva/sclera: Conjunctivae normal.      Pupils: Pupils are equal, round, and reactive to light.   Cardiovascular:      Rate and Rhythm: Normal rate and regular rhythm.      Pulses: Normal pulses.      Heart sounds: Normal heart sounds.   Pulmonary:      Effort: Pulmonary effort is normal. No respiratory distress.      Breath sounds: Normal breath sounds. No stridor. No wheezing, rhonchi or rales.   Musculoskeletal:      Cervical back: Normal range of motion and neck supple.   Skin:     General: Skin is warm and dry.      Capillary Refill: Capillary refill takes less than 2 seconds.   Neurological:      General: No focal deficit present.      Mental Status: She is alert  and oriented to person, place, and time.   Psychiatric:         Mood and Affect: Mood normal.         Behavior: Behavior normal.         Thought Content: Thought content normal.         Judgment: Judgment normal.     Assessment/Plan   Physical exam findings as noted above.  Patient was provided with prescriptions for Zithromax 250 mg and Tessalon 100 mg.  Supportive care instructions were discussed and the patient verbalizes clear understanding of same.    CLINICAL IMPRESSION:  Acute Bronchitis    Diagnoses and all orders for this visit:  Acute bronchitis, unspecified organism  -     benzonatate (Tessalon Perles) 100 mg capsule; Take 1 capsule (100 mg) by mouth 3 times a day as needed for cough for up to 10 days.  -     azithromycin (Zithromax) 250 mg tablet; Take 2 tablets (500 mg) on  Day 1,  followed by 1 tablet (250 mg) once daily on Days 2 through 5.    Patient disposition: Home

## 2024-05-23 ENCOUNTER — TELEPHONE (OUTPATIENT)
Dept: PRIMARY CARE | Facility: CLINIC | Age: 84
End: 2024-05-23
Payer: MEDICARE

## 2024-05-23 NOTE — TELEPHONE ENCOUNTER
The daughter Jerrica called stating that the patient was congestive since May 12th and she went to  on May 16    Patient was given a z-randell and something for cough for a sinus infection    Patient is still very congestive and want to know want else can she take    Please advise

## 2024-06-06 ENCOUNTER — APPOINTMENT (OUTPATIENT)
Dept: NEUROLOGY | Facility: CLINIC | Age: 84
End: 2024-06-06
Payer: MEDICARE

## 2024-06-18 ENCOUNTER — APPOINTMENT (OUTPATIENT)
Dept: PRIMARY CARE | Facility: CLINIC | Age: 84
End: 2024-06-18
Payer: MEDICARE

## 2024-08-16 DIAGNOSIS — E55.9 VITAMIN D DEFICIENCY: ICD-10-CM

## 2024-08-20 RX ORDER — ACETAMINOPHEN 500 MG
5000 TABLET ORAL DAILY
Qty: 90 TABLET | Refills: 1 | Status: SHIPPED | OUTPATIENT
Start: 2024-08-20

## 2024-09-19 ENCOUNTER — HOSPITAL ENCOUNTER (OUTPATIENT)
Dept: VASCULAR MEDICINE | Facility: HOSPITAL | Age: 84
Discharge: HOME | End: 2024-09-19
Payer: MEDICARE

## 2024-09-19 DIAGNOSIS — I65.21 OCCLUSION OF RIGHT CAROTID ARTERY: ICD-10-CM

## 2024-09-19 PROCEDURE — 93880 EXTRACRANIAL BILAT STUDY: CPT | Performed by: SURGERY

## 2024-09-19 PROCEDURE — 93880 EXTRACRANIAL BILAT STUDY: CPT

## 2024-09-24 ENCOUNTER — APPOINTMENT (OUTPATIENT)
Dept: VASCULAR SURGERY | Facility: CLINIC | Age: 84
End: 2024-09-24
Payer: MEDICARE

## 2024-11-12 ENCOUNTER — APPOINTMENT (OUTPATIENT)
Dept: VASCULAR SURGERY | Facility: CLINIC | Age: 84
End: 2024-11-12
Payer: MEDICARE

## 2024-11-12 VITALS
HEART RATE: 73 BPM | OXYGEN SATURATION: 99 % | SYSTOLIC BLOOD PRESSURE: 124 MMHG | DIASTOLIC BLOOD PRESSURE: 74 MMHG | BODY MASS INDEX: 24.84 KG/M2 | WEIGHT: 135 LBS | HEIGHT: 62 IN

## 2024-11-12 DIAGNOSIS — I65.21 OCCLUSION OF RIGHT CAROTID ARTERY: ICD-10-CM

## 2024-11-12 DIAGNOSIS — I65.22 LEFT CAROTID ARTERY STENOSIS: Primary | ICD-10-CM

## 2024-11-12 PROCEDURE — 1159F MED LIST DOCD IN RCRD: CPT | Performed by: SURGERY

## 2024-11-12 PROCEDURE — 1160F RVW MEDS BY RX/DR IN RCRD: CPT | Performed by: SURGERY

## 2024-11-12 PROCEDURE — 3078F DIAST BP <80 MM HG: CPT | Performed by: SURGERY

## 2024-11-12 PROCEDURE — 1036F TOBACCO NON-USER: CPT | Performed by: SURGERY

## 2024-11-12 PROCEDURE — 99214 OFFICE O/P EST MOD 30 MIN: CPT | Performed by: SURGERY

## 2024-11-12 PROCEDURE — 3074F SYST BP LT 130 MM HG: CPT | Performed by: SURGERY

## 2024-11-12 PROCEDURE — 1157F ADVNC CARE PLAN IN RCRD: CPT | Performed by: SURGERY

## 2024-11-12 NOTE — PROGRESS NOTES
History Of Present Illness  Carly Hoffman is a 83 y.o. female presenting for carotid follow-up.  She denies any changes since her last visit 6 months ago.  She does have noted dysarthria per her and her family member this is unchanged.  She will be starting speech therapy next month.  She continues to take aspirin and atorvastatin daily.     Past Medical History  She has a past medical history of Body mass index (BMI) 28.0-28.9, adult, Myocardial infarction (Multi) (2006), Personal history of other diseases of the circulatory system (03/16/2016), and Personal history of other diseases of the respiratory system (01/14/2021).    Surgical History  She has a past surgical history that includes Other surgical history (03/16/2016); Total hip arthroplasty (Bilateral, 10/23/2019); Colonoscopy (06/18/2020); Carpal tunnel release (06/29/2015); Other surgical history (06/29/2015); Hysterectomy (06/29/2015); Other surgical history (06/29/2015); Cataract extraction (Right); and Breast biopsy.     Social History  She reports that she quit smoking about 54 years ago. Her smoking use included cigarettes. She has been exposed to tobacco smoke. She has never used smokeless tobacco. She reports that she does not currently use alcohol. She reports that she does not use drugs.    Family History  Family History   Problem Relation Name Age of Onset    Other (CABG) Mother      Coronary artery disease Mother      Macular degeneration Mother      Alcohol abuse Father      Liver cancer Father      Dementia Brother          Allergies  Codeine phosphate    Review of Systems     Physical Exam  Vitals reviewed.   Constitutional:       General: She is not in acute distress.     Appearance: Normal appearance. She is normal weight.   HENT:      Head: Normocephalic and atraumatic.   Eyes:      Extraocular Movements: Extraocular movements intact.      Conjunctiva/sclera: Conjunctivae normal.   Neck:      Vascular: No carotid bruit.   Cardiovascular:  "     Rate and Rhythm: Normal rate and regular rhythm.      Pulses: Normal pulses.           Radial pulses are 2+ on the right side and 2+ on the left side.      Heart sounds: Normal heart sounds.   Pulmonary:      Effort: Pulmonary effort is normal.      Breath sounds: Normal breath sounds.   Abdominal:      General: Abdomen is flat.      Palpations: Abdomen is soft.   Musculoskeletal:         General: No swelling. Normal range of motion.      Cervical back: Normal range of motion. No tenderness.   Skin:     General: Skin is warm and dry.   Neurological:      General: No focal deficit present.      Mental Status: She is alert and oriented to person, place, and time.      Cranial Nerves: Dysarthria present. No cranial nerve deficit.      Sensory: No sensory deficit.      Motor: Motor function is intact. No weakness.   Psychiatric:         Mood and Affect: Mood normal.         Behavior: Behavior normal.          Last Recorded Vitals  Blood pressure 124/74, pulse 73, height 1.575 m (5' 2\"), weight 61.2 kg (135 lb), SpO2 99%.    Relevant Results      Current Outpatient Medications:     amLODIPine (Norvasc) 5 mg tablet, Take 1 tablet (5 mg) by mouth once daily., Disp: , Rfl:     aspirin 81 mg EC tablet, Take 1 tablet (81 mg) by mouth 2 times a day., Disp: , Rfl:     atorvastatin (Lipitor) 40 mg tablet, TAKE 1/2 TABLET BY MOUTH EVERY DAY, Disp: 45 tablet, Rfl: 3    azithromycin (Zithromax) 250 mg tablet, Take 2 tablets (500 mg) on  Day 1,  followed by 1 tablet (250 mg) once daily on Days 2 through 5., Disp: 6 tablet, Rfl: 0    cholecalciferol (Vitamin D-3) 5,000 Units tablet, TAKE 1 TABLET (5,000 UNITS) BY MOUTH ONCE DAILY., Disp: 90 tablet, Rfl: 1    cyanocobalamin, vitamin B-12, (VITAMIN B-12 ORAL), TABS, Disp: , Rfl:     ibuprofen 800 mg tablet, Take 1 tablet (800 mg) by mouth 4 times a day. With food for pain, Disp: , Rfl:     LORazepam (Ativan) 0.5 mg tablet, One tablet every 8 hours as needed for anxiety or " sleeplessness. (Patient not taking: Reported on 2/13/2024), Disp: 15 tablet, Rfl: 0    magnesium 250 mg tablet, Take 1 tablet (250 mg) by mouth once daily at bedtime., Disp: , Rfl:     melatonin 10 mg tablet, Take 1 tablet (10 mg) by mouth once daily at bedtime., Disp: , Rfl:     nystatin (Mycostatin) ointment, Apply 2-3 times daily to affected area(s), Disp: , Rfl:     pantoprazole (ProtoNix) 40 mg EC tablet, Take 1 tablet (40 mg) by mouth once daily in the morning. Take before meals. (Patient not taking: Reported on 2/13/2024), Disp: 90 tablet, Rfl: 1    POTASSIUM ORAL, Take tablet PRN, Disp: , Rfl:     traZODone (Desyrel) 50 mg tablet, Take 1 tablet (50 mg) by mouth once daily as needed for sleep., Disp: , Rfl:     triamcinolone (Kenalog) 0.1 % ointment, Apply to affected areas twice a day as needed. (Patient not taking: Reported on 2/13/2024), Disp: 30 g, Rfl: 5    vit C-E-cupric-zinc-lutein (PreserVision Lutein) 226-90-0.8-5 mg capsule capsule, Take as directed, Disp: , Rfl:        VASC US CAROTID ARTERY DUPLEX BILATERAL   Patient Name:      ROSYRAVI Sotomayor Physician: [id number] Anuj Marie DO  Study Date:        9/19/2024           Ordering           [id number] NICKY VANESSA                                         Physician:         PATRICA  MRN/PID:           [id number]            Technologist:      Nikky Espinal [facility]  Accession#:        [id number]        Technologist 2:  Date of Birth/Age: 1940 / 83      Encounter#:        [id number]                     years  Gender:            F  Admission Status:  Outpatient          Location           [facility]                                         Performed:   Diagnosis/ICD: Occlusion and stenosis of right carotid artery-I65.21  Indication:    Carotid Occlusion/Stenosis w/o infarct  CPT Codes:     24646 Cerebrovascular Carotid Duplex scan complete   CONCLUSIONS:  Right Carotid: Findings are consistent with an occlusion of the right internal carotid  artery. No flow seen by color Doppler. Unable to obtain a doppler signal in the right ECA that is suggestive of occlusion. Unable to obtain a doppler signal in the right CCA that is suggestive of occlusion. The right vertebral artery is patent with antegrade flow. No evidence of hemodynamically significant stenosis in the right subclavian artery. Right lobe thyroid nodule measuring 1.45 x 0.60 x 1.15cm. Vasculature is noted within this lesion.  Left Carotid: Findings are consistent with 50 to 69% stenosis of the left proximal internal carotid artery. Turbulent flow seen by color Doppler. Left external carotid artery appears patent with no evidence of stenosis. No evidence of hemodynamically significant stenosis of the left common carotid artery. The left vertebral artery is patent with antegrade flow. No evidence of hemodynamically significant stenosis in the left subclavian artery.   Comparison:  Compared with study from 3/13/2024, Degree of stenosis within the LICA has incresed to 50 - 69% from <50%.   Imaging & Doppler Findings:  Right Plaque Morph: The proximal right internal carotid artery demonstrates homogenous and heterogenous plaque. The mid right internal carotid artery demonstrates homogenous and heterogenous plaque. The distal right internal carotid artery demonstrates homogenous and heterogenous plaque. The proximal right external carotid artery demonstrates heterogenous and homogenous plaque. The mid right common carotid artery demonstrates irregular, homogenous and heterogenous plaque. The distal right common carotid artery demonstrates smooth and complex plaque.  Left Plaque Morph: The proximal left internal carotid artery demonstrates smooth and heterogenous plaque. The proximal left external carotid artery demonstrates smooth and heterogenous plaque. The distal left common carotid artery demonstrates smooth, homogenous and heterogenous plaque.    Right                        Left    PSV      EDV                 PSV      EDV  28 cm/s            CCA P    112 cm/s   0 cm/s            CCA D    71 cm/s   0 cm/s  0 cm/s    ICA P    152 cm/s 53 cm/s   0 cm/s            ICA M    118 cm/s 30 cm/s   0 cm/s            ICA D    90 cm/s   0 cm/s             ECA     163 cm/s  59 cm/s  13 cm/s Vertebral  59 cm/s  21 cm/s  165 cm/s         Subclavian 116 cm/s                 Left  ICA/CCA Ratio 2.1   [id number] Anuj Marie DO  Electronically signed by [id number] Anuj Marie DO on 9/19/2024 at 5:25:27 PM   ** Final **         Assessment/Plan   Diagnoses and all orders for this visit:  Left carotid artery stenosis  -     Vascular US carotid artery duplex bilateral; Future  Occlusion of right carotid artery  -     Vascular US carotid artery duplex bilateral; Future      84yo female presenting for carotid follow-up.  She has stable dysarthria.  She has no new lateralizing symptoms, and no focal deficits are noted on exam.  Recent carotid duplex reveals known finding of right ICA occlusion.  Her LICA is largely unchanged at 50 to 69% stenosis with a PSV of 152 cm/s.  I have recommended that she continue medical management with aspirin and atorvastatin.  I agree with starting speech therapy.  She is to follow-up in the vascular office in 6 months with repeat carotid duplex.       (This note was generated with voice recognition software and may contain errors including spelling, grammar, syntax and missed recognition of what was dictated, of which may not have been fully corrected)        Nancy Reese MD

## 2025-01-16 ENCOUNTER — OFFICE (OUTPATIENT)
Dept: URBAN - METROPOLITAN AREA CLINIC 26 | Facility: CLINIC | Age: 85
End: 2025-01-16
Payer: MEDICARE

## 2025-01-16 VITALS
HEIGHT: 61 IN | DIASTOLIC BLOOD PRESSURE: 87 MMHG | SYSTOLIC BLOOD PRESSURE: 152 MMHG | TEMPERATURE: 98 F | HEART RATE: 82 BPM | WEIGHT: 129 LBS

## 2025-01-16 DIAGNOSIS — Z86.0100 PERSONAL HISTORY OF COLON POLYPS, UNSPECIFIED: ICD-10-CM

## 2025-01-16 DIAGNOSIS — K22.70 BARRETT'S ESOPHAGUS WITHOUT DYSPLASIA: ICD-10-CM

## 2025-01-16 DIAGNOSIS — K21.9 GASTRO-ESOPHAGEAL REFLUX DISEASE WITHOUT ESOPHAGITIS: ICD-10-CM

## 2025-01-16 DIAGNOSIS — Z80.0 FAMILY HISTORY OF MALIGNANT NEOPLASM OF DIGESTIVE ORGANS: ICD-10-CM

## 2025-01-16 PROCEDURE — 99204 OFFICE O/P NEW MOD 45 MIN: CPT | Performed by: INTERNAL MEDICINE

## 2025-01-17 VITALS
DIASTOLIC BLOOD PRESSURE: 68 MMHG | OXYGEN SATURATION: 99 % | HEART RATE: 71 BPM | SYSTOLIC BLOOD PRESSURE: 149 MMHG | DIASTOLIC BLOOD PRESSURE: 66 MMHG | DIASTOLIC BLOOD PRESSURE: 74 MMHG | RESPIRATION RATE: 13 BRPM | DIASTOLIC BLOOD PRESSURE: 67 MMHG | TEMPERATURE: 97.8 F | DIASTOLIC BLOOD PRESSURE: 74 MMHG | HEART RATE: 60 BPM | RESPIRATION RATE: 14 BRPM | HEART RATE: 72 BPM | HEART RATE: 60 BPM | HEART RATE: 65 BPM | SYSTOLIC BLOOD PRESSURE: 132 MMHG | DIASTOLIC BLOOD PRESSURE: 65 MMHG | DIASTOLIC BLOOD PRESSURE: 68 MMHG | SYSTOLIC BLOOD PRESSURE: 144 MMHG | DIASTOLIC BLOOD PRESSURE: 66 MMHG | SYSTOLIC BLOOD PRESSURE: 134 MMHG | DIASTOLIC BLOOD PRESSURE: 68 MMHG | HEART RATE: 64 BPM | DIASTOLIC BLOOD PRESSURE: 72 MMHG | DIASTOLIC BLOOD PRESSURE: 75 MMHG | RESPIRATION RATE: 22 BRPM | WEIGHT: 129 LBS | SYSTOLIC BLOOD PRESSURE: 128 MMHG | RESPIRATION RATE: 14 BRPM | HEART RATE: 63 BPM | RESPIRATION RATE: 11 BRPM | RESPIRATION RATE: 13 BRPM | DIASTOLIC BLOOD PRESSURE: 73 MMHG | OXYGEN SATURATION: 98 % | HEIGHT: 61 IN | DIASTOLIC BLOOD PRESSURE: 73 MMHG | SYSTOLIC BLOOD PRESSURE: 132 MMHG | RESPIRATION RATE: 22 BRPM | SYSTOLIC BLOOD PRESSURE: 144 MMHG | SYSTOLIC BLOOD PRESSURE: 127 MMHG | SYSTOLIC BLOOD PRESSURE: 149 MMHG | HEART RATE: 63 BPM | RESPIRATION RATE: 14 BRPM | HEART RATE: 72 BPM | OXYGEN SATURATION: 100 % | HEART RATE: 74 BPM | SYSTOLIC BLOOD PRESSURE: 144 MMHG | OXYGEN SATURATION: 98 % | OXYGEN SATURATION: 99 % | HEART RATE: 60 BPM | SYSTOLIC BLOOD PRESSURE: 127 MMHG | RESPIRATION RATE: 18 BRPM | HEIGHT: 61 IN | DIASTOLIC BLOOD PRESSURE: 75 MMHG | SYSTOLIC BLOOD PRESSURE: 116 MMHG | OXYGEN SATURATION: 97 % | HEART RATE: 63 BPM | SYSTOLIC BLOOD PRESSURE: 146 MMHG | RESPIRATION RATE: 18 BRPM | HEART RATE: 70 BPM | DIASTOLIC BLOOD PRESSURE: 75 MMHG | OXYGEN SATURATION: 98 % | HEART RATE: 70 BPM | OXYGEN SATURATION: 100 % | SYSTOLIC BLOOD PRESSURE: 146 MMHG | DIASTOLIC BLOOD PRESSURE: 72 MMHG | HEIGHT: 61 IN | DIASTOLIC BLOOD PRESSURE: 74 MMHG | RESPIRATION RATE: 11 BRPM | HEART RATE: 64 BPM | HEART RATE: 74 BPM | OXYGEN SATURATION: 97 % | WEIGHT: 129 LBS | RESPIRATION RATE: 12 BRPM | WEIGHT: 129 LBS | SYSTOLIC BLOOD PRESSURE: 119 MMHG | SYSTOLIC BLOOD PRESSURE: 128 MMHG | SYSTOLIC BLOOD PRESSURE: 134 MMHG | OXYGEN SATURATION: 99 % | RESPIRATION RATE: 12 BRPM | DIASTOLIC BLOOD PRESSURE: 66 MMHG | OXYGEN SATURATION: 100 % | DIASTOLIC BLOOD PRESSURE: 67 MMHG | SYSTOLIC BLOOD PRESSURE: 119 MMHG | HEART RATE: 72 BPM | RESPIRATION RATE: 12 BRPM | DIASTOLIC BLOOD PRESSURE: 65 MMHG | SYSTOLIC BLOOD PRESSURE: 116 MMHG | RESPIRATION RATE: 22 BRPM | DIASTOLIC BLOOD PRESSURE: 72 MMHG | HEART RATE: 64 BPM | DIASTOLIC BLOOD PRESSURE: 65 MMHG | RESPIRATION RATE: 11 BRPM | HEART RATE: 65 BPM | SYSTOLIC BLOOD PRESSURE: 134 MMHG | HEART RATE: 65 BPM | SYSTOLIC BLOOD PRESSURE: 116 MMHG | SYSTOLIC BLOOD PRESSURE: 146 MMHG | TEMPERATURE: 97.8 F | SYSTOLIC BLOOD PRESSURE: 119 MMHG | SYSTOLIC BLOOD PRESSURE: 149 MMHG | OXYGEN SATURATION: 97 % | SYSTOLIC BLOOD PRESSURE: 127 MMHG | HEART RATE: 71 BPM | HEART RATE: 70 BPM | SYSTOLIC BLOOD PRESSURE: 128 MMHG | SYSTOLIC BLOOD PRESSURE: 132 MMHG | RESPIRATION RATE: 13 BRPM | RESPIRATION RATE: 18 BRPM | HEART RATE: 71 BPM | TEMPERATURE: 97.8 F | HEART RATE: 74 BPM | DIASTOLIC BLOOD PRESSURE: 67 MMHG | DIASTOLIC BLOOD PRESSURE: 73 MMHG

## 2025-01-21 ENCOUNTER — AMBULATORY SURGICAL CENTER (OUTPATIENT)
Dept: URBAN - METROPOLITAN AREA SURGERY 12 | Facility: SURGERY | Age: 85
End: 2025-01-21
Payer: MEDICARE

## 2025-01-21 DIAGNOSIS — R13.10 DYSPHAGIA, UNSPECIFIED: ICD-10-CM

## 2025-01-21 DIAGNOSIS — K29.70 GASTRITIS, UNSPECIFIED, WITHOUT BLEEDING: ICD-10-CM

## 2025-01-21 DIAGNOSIS — K44.9 DIAPHRAGMATIC HERNIA WITHOUT OBSTRUCTION OR GANGRENE: ICD-10-CM

## 2025-01-21 DIAGNOSIS — K31.89 OTHER DISEASES OF STOMACH AND DUODENUM: ICD-10-CM

## 2025-01-21 DIAGNOSIS — K22.89 OTHER SPECIFIED DISEASE OF ESOPHAGUS: ICD-10-CM

## 2025-01-21 DIAGNOSIS — K21.9 GASTRO-ESOPHAGEAL REFLUX DISEASE WITHOUT ESOPHAGITIS: ICD-10-CM

## 2025-01-21 DIAGNOSIS — K22.70 BARRETT'S ESOPHAGUS WITHOUT DYSPLASIA: ICD-10-CM

## 2025-01-21 PROCEDURE — 43239 EGD BIOPSY SINGLE/MULTIPLE: CPT | Performed by: INTERNAL MEDICINE

## 2025-01-21 PROCEDURE — 43450 DILATE ESOPHAGUS 1/MULT PASS: CPT | Performed by: INTERNAL MEDICINE

## 2025-01-21 RX ORDER — OMEPRAZOLE 20 MG/1
CAPSULE, DELAYED RELEASE ORAL
Qty: 90 | Refills: 1 | Status: ACTIVE
Start: 2025-01-21

## 2025-03-19 ENCOUNTER — HOSPITAL ENCOUNTER (OUTPATIENT)
Dept: VASCULAR MEDICINE | Facility: CLINIC | Age: 85
Discharge: HOME | End: 2025-03-19
Payer: MEDICARE

## 2025-03-19 DIAGNOSIS — I65.22 LEFT CAROTID ARTERY STENOSIS: ICD-10-CM

## 2025-03-19 DIAGNOSIS — I65.23 OCCLUSION AND STENOSIS OF BILATERAL CAROTID ARTERIES: ICD-10-CM

## 2025-03-19 DIAGNOSIS — I65.21 OCCLUSION OF RIGHT CAROTID ARTERY: ICD-10-CM

## 2025-03-19 PROCEDURE — 93880 EXTRACRANIAL BILAT STUDY: CPT

## 2025-03-19 PROCEDURE — 93880 EXTRACRANIAL BILAT STUDY: CPT | Performed by: INTERNAL MEDICINE

## 2025-03-20 ENCOUNTER — APPOINTMENT (OUTPATIENT)
Dept: VASCULAR MEDICINE | Facility: CLINIC | Age: 85
End: 2025-03-20
Payer: MEDICARE

## 2025-04-01 ENCOUNTER — APPOINTMENT (OUTPATIENT)
Dept: VASCULAR SURGERY | Facility: CLINIC | Age: 85
End: 2025-04-01
Payer: MEDICARE

## 2025-04-01 VITALS
HEART RATE: 79 BPM | HEIGHT: 62 IN | WEIGHT: 130 LBS | OXYGEN SATURATION: 98 % | BODY MASS INDEX: 23.92 KG/M2 | DIASTOLIC BLOOD PRESSURE: 68 MMHG | SYSTOLIC BLOOD PRESSURE: 122 MMHG

## 2025-04-01 DIAGNOSIS — I65.22 LEFT CAROTID ARTERY STENOSIS: ICD-10-CM

## 2025-04-01 DIAGNOSIS — I65.21 OCCLUSION OF RIGHT CAROTID ARTERY: Primary | ICD-10-CM

## 2025-04-01 PROCEDURE — 1160F RVW MEDS BY RX/DR IN RCRD: CPT | Performed by: SURGERY

## 2025-04-01 PROCEDURE — 99214 OFFICE O/P EST MOD 30 MIN: CPT | Performed by: SURGERY

## 2025-04-01 PROCEDURE — 1157F ADVNC CARE PLAN IN RCRD: CPT | Performed by: SURGERY

## 2025-04-01 PROCEDURE — 1159F MED LIST DOCD IN RCRD: CPT | Performed by: SURGERY

## 2025-04-01 PROCEDURE — 3074F SYST BP LT 130 MM HG: CPT | Performed by: SURGERY

## 2025-04-01 PROCEDURE — 1036F TOBACCO NON-USER: CPT | Performed by: SURGERY

## 2025-04-01 PROCEDURE — 3078F DIAST BP <80 MM HG: CPT | Performed by: SURGERY

## 2025-04-01 RX ORDER — OMEPRAZOLE 20 MG/1
1 CAPSULE, DELAYED RELEASE ORAL
COMMUNITY
Start: 2025-01-21

## 2025-04-01 RX ORDER — ROSUVASTATIN CALCIUM 20 MG/1
20 TABLET, COATED ORAL
COMMUNITY

## 2025-04-01 RX ORDER — FAMOTIDINE 20 MG/1
1 TABLET, FILM COATED ORAL
COMMUNITY
Start: 2025-01-16

## 2025-04-01 ASSESSMENT — ENCOUNTER SYMPTOMS
PSYCHIATRIC NEGATIVE: 1
HEADACHES: 0
ENDOCRINE NEGATIVE: 1
COUGH: 0
BACK PAIN: 0
WOUND: 0
EYES NEGATIVE: 1
CONSTITUTIONAL NEGATIVE: 1
WEAKNESS: 0
GASTROINTESTINAL NEGATIVE: 1
SHORTNESS OF BREATH: 0
SPEECH DIFFICULTY: 0
COLOR CHANGE: 0
BRUISES/BLEEDS EASILY: 0
NUMBNESS: 0
DIZZINESS: 0

## 2025-04-01 NOTE — PROGRESS NOTES
History Of Present Illness  Carly Hoffman is a 84 y.o. female presenting for carotid follow-up. She denies any changes since her last visit 6 months ago. She does have known dysarthria, which remained stable. She continues to take aspirin and atorvastatin daily.  She continues to take aspirin daily.  She was recently switched from atorvastatin to rosuvastatin by her PCP.  Recent carotid duplex reveals right ICA of lesion and left ICA stenosis of 50 to 69%.  This is unchanged from the prior study.     Past Medical History  She has a past medical history of Body mass index (BMI) 28.0-28.9, adult, Myocardial infarction (Multi) (2006), Personal history of other diseases of the circulatory system (03/16/2016), and Personal history of other diseases of the respiratory system (01/14/2021).    Surgical History  She has a past surgical history that includes Other surgical history (03/16/2016); Total hip arthroplasty (Bilateral, 10/23/2019); Colonoscopy (06/18/2020); Carpal tunnel release (06/29/2015); Other surgical history (06/29/2015); Hysterectomy (06/29/2015); Other surgical history (06/29/2015); Cataract extraction (Right); and Breast biopsy.     Social History  She reports that she quit smoking about 55 years ago. Her smoking use included cigarettes. She has been exposed to tobacco smoke. She has never used smokeless tobacco. She reports that she does not currently use alcohol. She reports that she does not use drugs.    Family History  Family History   Problem Relation Name Age of Onset    Other (CABG) Mother      Coronary artery disease Mother      Macular degeneration Mother      Alcohol abuse Father      Liver cancer Father      Dementia Brother          Allergies  Codeine phosphate    Review of Systems   Constitutional: Negative.    HENT: Negative.     Eyes: Negative.    Respiratory:  Negative for cough and shortness of breath.    Cardiovascular:  Negative for chest pain and leg swelling.   Gastrointestinal:  "Negative.    Endocrine: Negative.    Genitourinary: Negative.    Musculoskeletal:  Negative for back pain and gait problem.   Skin:  Negative for color change, pallor and wound.   Neurological:  Negative for dizziness, syncope, speech difficulty, weakness, numbness and headaches.   Hematological:  Does not bruise/bleed easily.   Psychiatric/Behavioral: Negative.          Physical Exam  Vitals reviewed.   Constitutional:       General: She is not in acute distress.     Appearance: Normal appearance. She is normal weight.   HENT:      Head: Normocephalic and atraumatic.   Eyes:      Extraocular Movements: Extraocular movements intact.      Conjunctiva/sclera: Conjunctivae normal.   Neck:      Vascular: No carotid bruit.   Cardiovascular:      Rate and Rhythm: Normal rate and regular rhythm.      Pulses: Normal pulses.           Radial pulses are 2+ on the right side and 2+ on the left side.      Heart sounds: Normal heart sounds.   Pulmonary:      Effort: Pulmonary effort is normal.      Breath sounds: Normal breath sounds.   Abdominal:      General: Abdomen is flat.      Palpations: Abdomen is soft.   Musculoskeletal:         General: No swelling. Normal range of motion.      Cervical back: Normal range of motion. No tenderness.   Skin:     General: Skin is warm and dry.   Neurological:      General: No focal deficit present.      Mental Status: She is alert and oriented to person, place, and time.      Cranial Nerves: Dysarthria present. No cranial nerve deficit.      Sensory: No sensory deficit.      Motor: Motor function is intact. No weakness.   Psychiatric:         Mood and Affect: Mood normal.         Behavior: Behavior normal.          Last Recorded Vitals  Blood pressure 122/68, pulse 79, height 1.575 m (5' 2\"), weight 59 kg (130 lb), SpO2 98%.    Relevant Results      Current Outpatient Medications:     amLODIPine (Norvasc) 5 mg tablet, Take 1 tablet (5 mg) by mouth once daily., Disp: , Rfl:     aspirin 81 " mg EC tablet, Take 1 tablet (81 mg) by mouth 2 times a day., Disp: , Rfl:     cholecalciferol (Vitamin D-3) 5,000 Units tablet, TAKE 1 TABLET (5,000 UNITS) BY MOUTH ONCE DAILY., Disp: 90 tablet, Rfl: 1    cyanocobalamin, vitamin B-12, (VITAMIN B-12 ORAL), TABS, Disp: , Rfl:     famotidine (Pepcid) 20 mg tablet, Take 1 tablet (20 mg) by mouth every 12 hours., Disp: , Rfl:     magnesium 250 mg tablet, Take 1 tablet (250 mg) by mouth once daily at bedtime., Disp: , Rfl:     melatonin 10 mg tablet, Take 1 tablet (10 mg) by mouth once daily at bedtime., Disp: , Rfl:     nystatin (Mycostatin) ointment, Apply 2-3 times daily to affected area(s), Disp: , Rfl:     omeprazole (PriLOSEC) 20 mg DR capsule, Take 1 capsule (20 mg) by mouth early in the morning.., Disp: , Rfl:     pantoprazole (ProtoNix) 40 mg EC tablet, Take 1 tablet (40 mg) by mouth once daily in the morning. Take before meals., Disp: 90 tablet, Rfl: 1    POTASSIUM ORAL, Take tablet PRN, Disp: , Rfl:     rosuvastatin (Crestor) 20 mg tablet, Take 1 tablet (20 mg) by mouth., Disp: , Rfl:     triamcinolone (Kenalog) 0.1 % ointment, Apply to affected areas twice a day as needed., Disp: 30 g, Rfl: 5    vit C-E-cupric-zinc-lutein (PreserVision Lutein) 226-90-0.8-5 mg capsule capsule, Take as directed, Disp: , Rfl:     azithromycin (Zithromax) 250 mg tablet, Take 2 tablets (500 mg) on  Day 1,  followed by 1 tablet (250 mg) once daily on Days 2 through 5., Disp: 6 tablet, Rfl: 0    ibuprofen 800 mg tablet, Take 1 tablet (800 mg) by mouth 4 times a day. With food for pain, Disp: , Rfl:     LORazepam (Ativan) 0.5 mg tablet, One tablet every 8 hours as needed for anxiety or sleeplessness. (Patient not taking: Reported on 2/13/2024), Disp: 15 tablet, Rfl: 0    traZODone (Desyrel) 50 mg tablet, Take 1 tablet (50 mg) by mouth once daily as needed for sleep., Disp: , Rfl:        Vascular US carotid artery duplex bilateral    Result Date: 3/20/2025           [place]  [facility], Vascular Lab [address], [place] [address]             Tel [phone] and Fax [phone]  Vascular Lab Report [facility] US CAROTID ARTERY DUPLEX BILATERAL  Patient Name:      ROSY JANNA MCCALLUM        Reading Physician:  [id number] Cheyanne Deras MD Study Date:        3/19/2025            Ordering Physician: [id number] NICKY BURCIAGA MRN/PID:           [id number]             Technologist:       Loi Osei [facility] Accession#:        [id number]         Technologist 2: Date of Birth/Age: 1940 / 84 years Encounter#:         [id number] Gender:            F Admission Status:  Outpatient           Location Performed: [facility]  Diagnosis/ICD: Occlusion and stenosis of bilateral carotid arteries-I65.23 CPT Codes:     44986 Cerebrovascular Carotid Duplex scan complete  Patient History Known right CCA-ICA occlusion.  CONCLUSIONS: Right Carotid: Findings are consistent with an occlusion of the right internal carotid artery. (known finiding). ECA is noted to be occluded at the origin/proximal segment. However, becomes patent at mid via retrograde branches. CCA with minimal flow. The right vertebral artery is patent with antegrade flow. No evidence of hemodynamically significant stenosis in the right subclavian artery. Left Carotid: Findings are consistent with 50 to 69% stenosis of the left proximal internal carotid artery. Velocities and degree of stenosis maybe underestimated. There is a >50% stenosis noted in the left external carotid artery. The left vertebral artery is patent with antegrade flow. No evidence of hemodynamically significant stenosis in the left subclavian artery.  Imaging & Doppler Findings: Right Plaque Morph: The proximal right internal carotid artery  demonstrates heterogenous plaque. The mid right internal carotid artery demonstrates heterogenous plaque. The distal right internal carotid artery demonstrates heterogenous plaque. The proximal right external carotid artery demonstrates heterogenous plaque. The mid right common carotid artery demonstrates heterogenous plaque. The distal right common carotid artery demonstrates heterogenous plaque. Left Plaque Morph: The proximal left internal carotid artery demonstrates heterogenous and calcified plaque. The proximal left external carotid artery demonstrates heterogenous plaque. The distal left common carotid artery demonstrates heterogenous plaque.   Right                        Left   PSV      EDV                PSV      EDV 26 cm/s  0 cm/s    CCA P    110 cm/s 31 cm/s 32 cm/s  0 cm/s    CCA D    76 cm/s  25 cm/s                    ICA P    182 cm/s 61 cm/s                    ICA M    187 cm/s 38 cm/s                    ICA D    83 cm/s  31 cm/s 63 cm/s  15 cm/s    ECA     255 cm/s 33 cm/s 75 cm/s  22 cm/s Vertebral  88 cm/s  21 cm/s 153 cm/s         Subclavian 113 cm/s               Left ICA/CCA Ratio 2.4   [id number] Cheyanne Deras MD Electronically signed by [id number] Cheyanne Deras MD on 3/20/2025 at 11:34:30 AM  ** Final **            Assessment/Plan   Diagnoses and all orders for this visit:  Occlusion of right carotid artery  Left carotid artery stenosis  -     Vascular US Carotid Artery Duplex Bilateral; Future      83yo female presenting for carotid follow-up.  She has stable dysarthria.  She has no new lateralizing symptoms, and no focal deficits are noted on exam.  Recent carotid duplex reveals known finding of right ICA occlusion.  Her LICA is largely unchanged at 50 to 69% stenosis with a PSV of 182 cm/s.  I have recommended that she continue medical management with aspirin and rosuvastatin.  I agree with starting speech therapy.  She is to follow-up in the vascular office in 6 months with repeat carotid  duplex.           (This note was generated with voice recognition software and may contain errors including spelling, grammar, syntax and missed recognition of what was dictated, of which may not have been fully corrected)            Nancy Reese MD    Gabapentin Pregnancy And Lactation Text: This medication is Pregnancy Category C and isn't considered safe during pregnancy. It is excreted in breast milk.

## 2025-09-03 ENCOUNTER — OFFICE (OUTPATIENT)
Dept: URBAN - METROPOLITAN AREA CLINIC 27 | Facility: CLINIC | Age: 85
End: 2025-09-03
Payer: MEDICARE

## 2025-09-03 VITALS
HEIGHT: 61 IN | HEART RATE: 71 BPM | DIASTOLIC BLOOD PRESSURE: 83 MMHG | SYSTOLIC BLOOD PRESSURE: 151 MMHG | TEMPERATURE: 98 F | WEIGHT: 131 LBS

## 2025-09-03 DIAGNOSIS — R13.10 DYSPHAGIA, UNSPECIFIED: ICD-10-CM

## 2025-09-03 DIAGNOSIS — K22.70 BARRETT'S ESOPHAGUS WITHOUT DYSPLASIA: ICD-10-CM

## 2025-09-03 PROCEDURE — 99214 OFFICE O/P EST MOD 30 MIN: CPT | Performed by: NURSE PRACTITIONER

## 2025-10-07 ENCOUNTER — APPOINTMENT (OUTPATIENT)
Dept: VASCULAR SURGERY | Facility: CLINIC | Age: 85
End: 2025-10-07
Payer: MEDICARE